# Patient Record
Sex: MALE | Race: BLACK OR AFRICAN AMERICAN | NOT HISPANIC OR LATINO | Employment: UNEMPLOYED | URBAN - METROPOLITAN AREA
[De-identification: names, ages, dates, MRNs, and addresses within clinical notes are randomized per-mention and may not be internally consistent; named-entity substitution may affect disease eponyms.]

---

## 2020-01-01 ENCOUNTER — TELEPHONE (OUTPATIENT)
Dept: PULMONOLOGY | Facility: CLINIC | Age: 60
End: 2020-01-01

## 2020-01-01 ENCOUNTER — ANESTHESIA (OUTPATIENT)
Dept: CARDIOLOGY | Facility: HOSPITAL | Age: 60
DRG: 193 | End: 2020-01-01
Payer: MEDICAID

## 2020-01-01 ENCOUNTER — ANESTHESIA EVENT (OUTPATIENT)
Dept: CARDIOLOGY | Facility: HOSPITAL | Age: 60
DRG: 193 | End: 2020-01-01
Payer: MEDICAID

## 2020-01-01 ENCOUNTER — HOSPITAL ENCOUNTER (INPATIENT)
Facility: HOSPITAL | Age: 60
LOS: 8 days | Discharge: HOME | DRG: 193 | End: 2020-05-31
Attending: EMERGENCY MEDICINE | Admitting: INTERNAL MEDICINE
Payer: MEDICAID

## 2020-01-01 VITALS
WEIGHT: 184.5 LBS | HEIGHT: 63 IN | DIASTOLIC BLOOD PRESSURE: 70 MMHG | SYSTOLIC BLOOD PRESSURE: 109 MMHG | BODY MASS INDEX: 32.69 KG/M2 | OXYGEN SATURATION: 92 % | RESPIRATION RATE: 16 BRPM | TEMPERATURE: 97 F | HEART RATE: 88 BPM

## 2020-01-01 DIAGNOSIS — J96.01 ACUTE RESPIRATORY FAILURE WITH HYPOXIA: Primary | ICD-10-CM

## 2020-01-01 DIAGNOSIS — R07.9 CHEST PAIN: ICD-10-CM

## 2020-01-01 DIAGNOSIS — I50.9 ACUTE CONGESTIVE HEART FAILURE, UNSPECIFIED HEART FAILURE TYPE: ICD-10-CM

## 2020-01-01 DIAGNOSIS — R06.02 SOB (SHORTNESS OF BREATH): ICD-10-CM

## 2020-01-01 DIAGNOSIS — J18.9 PNEUMONIA OF LEFT LOWER LOBE DUE TO INFECTIOUS ORGANISM: ICD-10-CM

## 2020-01-01 DIAGNOSIS — R79.89 TROPONIN I ABOVE REFERENCE RANGE: ICD-10-CM

## 2020-01-01 DIAGNOSIS — I35.0 SEVERE AORTIC STENOSIS: ICD-10-CM

## 2020-01-01 DIAGNOSIS — I05.0 MITRAL STENOSIS: ICD-10-CM

## 2020-01-01 DIAGNOSIS — R06.00 DYSPNEA: ICD-10-CM

## 2020-01-01 DIAGNOSIS — I34.2 NONRHEUMATIC MITRAL VALVE STENOSIS: ICD-10-CM

## 2020-01-01 DIAGNOSIS — I05.2: ICD-10-CM

## 2020-01-01 LAB
ABO + RH BLD: NORMAL
ACE SERPL-CCNC: 21 U/L (ref 16–85)
ALBUMIN SERPL BCP-MCNC: 3.4 G/DL (ref 3.5–5.2)
ALLENS TEST: ABNORMAL
ALP SERPL-CCNC: 65 U/L (ref 55–135)
ALT SERPL W/O P-5'-P-CCNC: 26 U/L (ref 10–44)
ANA PATTERN 1: NORMAL
ANA SER QL IF: POSITIVE
ANA TITR SER IF: NORMAL {TITER}
ANION GAP SERPL CALC-SCNC: 10 MMOL/L (ref 8–16)
ANION GAP SERPL CALC-SCNC: 10 MMOL/L (ref 8–16)
ANION GAP SERPL CALC-SCNC: 11 MMOL/L (ref 8–16)
ANION GAP SERPL CALC-SCNC: 11 MMOL/L (ref 8–16)
ANION GAP SERPL CALC-SCNC: 12 MMOL/L (ref 8–16)
ANION GAP SERPL CALC-SCNC: 13 MMOL/L (ref 8–16)
ANION GAP SERPL CALC-SCNC: 13 MMOL/L (ref 8–16)
ANION GAP SERPL CALC-SCNC: 7 MMOL/L (ref 8–16)
ANION GAP SERPL CALC-SCNC: 8 MMOL/L (ref 8–16)
ANISOCYTOSIS BLD QL SMEAR: SLIGHT
ANTI SM ANTIBODY: 0.07 RATIO (ref 0–0.99)
ANTI SM/RNP ANTIBODY: 0.1 RATIO (ref 0–0.99)
ANTI-SM INTERPRETATION: NEGATIVE
ANTI-SM/RNP INTERPRETATION: NEGATIVE
ANTI-SSA ANTIBODY: 0.12 RATIO (ref 0–0.99)
ANTI-SSA INTERPRETATION: NEGATIVE
ANTI-SSB ANTIBODY: 0.05 RATIO (ref 0–0.99)
ANTI-SSB INTERPRETATION: NEGATIVE
AORTIC ROOT ANNULUS: 3.82 CM
APTT BLDCRRT: 29.3 SEC (ref 21–32)
ASCENDING AORTA: 3.43 CM
AST SERPL-CCNC: 25 U/L (ref 10–40)
AV INDEX (PROSTH): 0.88
AV MEAN GRADIENT: 5 MMHG
AV PEAK GRADIENT: 9 MMHG
AV VALVE AREA: 2.35 CM2
AV VELOCITY RATIO: 0.85
BACTERIA BLD CULT: NORMAL
BACTERIA BLD CULT: NORMAL
BACTERIA SPEC AEROBE CULT: NORMAL
BASOPHILS # BLD AUTO: 0.01 K/UL (ref 0–0.2)
BASOPHILS # BLD AUTO: 0.02 K/UL (ref 0–0.2)
BASOPHILS # BLD AUTO: 0.05 K/UL (ref 0–0.2)
BASOPHILS # BLD AUTO: 0.07 K/UL (ref 0–0.2)
BASOPHILS # BLD AUTO: 0.08 K/UL (ref 0–0.2)
BASOPHILS NFR BLD: 0.1 % (ref 0–1.9)
BASOPHILS NFR BLD: 0.2 % (ref 0–1.9)
BASOPHILS NFR BLD: 0.4 % (ref 0–1.9)
BASOPHILS NFR BLD: 0.4 % (ref 0–1.9)
BILIRUB SERPL-MCNC: 0.5 MG/DL (ref 0.1–1)
BLD GP AB SCN CELLS X3 SERPL QL: NORMAL
BLD PROD TYP BPU: NORMAL
BLD PROD TYP BPU: NORMAL
BLOOD UNIT EXPIRATION DATE: NORMAL
BLOOD UNIT EXPIRATION DATE: NORMAL
BLOOD UNIT TYPE CODE: 5100
BLOOD UNIT TYPE CODE: 5100
BLOOD UNIT TYPE: NORMAL
BLOOD UNIT TYPE: NORMAL
BNP SERPL-MCNC: 1545 PG/ML (ref 0–99)
BNP SERPL-MCNC: 760 PG/ML (ref 0–99)
BSA FOR ECHO PROCEDURE: 1.85 M2
BSA FOR ECHO PROCEDURE: 1.88 M2
BUN SERPL-MCNC: 27 MG/DL (ref 6–20)
BUN SERPL-MCNC: 30 MG/DL (ref 6–20)
BUN SERPL-MCNC: 32 MG/DL (ref 6–20)
BUN SERPL-MCNC: 35 MG/DL (ref 6–20)
BUN SERPL-MCNC: 41 MG/DL (ref 6–20)
BUN SERPL-MCNC: 46 MG/DL (ref 6–20)
BUN SERPL-MCNC: 48 MG/DL (ref 6–20)
BUN SERPL-MCNC: 54 MG/DL (ref 6–20)
BUN SERPL-MCNC: 55 MG/DL (ref 6–20)
CALCIUM SERPL-MCNC: 8.7 MG/DL (ref 8.7–10.5)
CALCIUM SERPL-MCNC: 8.9 MG/DL (ref 8.7–10.5)
CALCIUM SERPL-MCNC: 9 MG/DL (ref 8.7–10.5)
CALCIUM SERPL-MCNC: 9.2 MG/DL (ref 8.7–10.5)
CALCIUM SERPL-MCNC: 9.3 MG/DL (ref 8.7–10.5)
CALCIUM SERPL-MCNC: 9.3 MG/DL (ref 8.7–10.5)
CALCIUM SERPL-MCNC: 9.4 MG/DL (ref 8.7–10.5)
CALCIUM SERPL-MCNC: 9.7 MG/DL (ref 8.7–10.5)
CALCIUM SERPL-MCNC: 9.8 MG/DL (ref 8.7–10.5)
CHLORIDE SERPL-SCNC: 100 MMOL/L (ref 95–110)
CHLORIDE SERPL-SCNC: 101 MMOL/L (ref 95–110)
CHLORIDE SERPL-SCNC: 102 MMOL/L (ref 95–110)
CHLORIDE SERPL-SCNC: 103 MMOL/L (ref 95–110)
CHLORIDE SERPL-SCNC: 103 MMOL/L (ref 95–110)
CHLORIDE SERPL-SCNC: 97 MMOL/L (ref 95–110)
CHLORIDE SERPL-SCNC: 98 MMOL/L (ref 95–110)
CK MB SERPL-MCNC: 3.4 NG/ML (ref 0.1–6.5)
CK MB SERPL-RTO: 2.7 % (ref 0–5)
CK SERPL-CCNC: 125 U/L (ref 20–200)
CK SERPL-CCNC: 125 U/L (ref 20–200)
CO2 SERPL-SCNC: 21 MMOL/L (ref 23–29)
CO2 SERPL-SCNC: 22 MMOL/L (ref 23–29)
CO2 SERPL-SCNC: 22 MMOL/L (ref 23–29)
CO2 SERPL-SCNC: 23 MMOL/L (ref 23–29)
CO2 SERPL-SCNC: 23 MMOL/L (ref 23–29)
CO2 SERPL-SCNC: 24 MMOL/L (ref 23–29)
CO2 SERPL-SCNC: 25 MMOL/L (ref 23–29)
CODING SYSTEM: NORMAL
CODING SYSTEM: NORMAL
CREAT SERPL-MCNC: 1.2 MG/DL (ref 0.5–1.4)
CREAT SERPL-MCNC: 1.5 MG/DL (ref 0.5–1.4)
CREAT SERPL-MCNC: 1.6 MG/DL (ref 0.5–1.4)
CREAT SERPL-MCNC: 1.6 MG/DL (ref 0.5–1.4)
CREAT SERPL-MCNC: 1.7 MG/DL (ref 0.5–1.4)
CREAT SERPL-MCNC: 1.7 MG/DL (ref 0.5–1.4)
CREAT SERPL-MCNC: 1.8 MG/DL (ref 0.5–1.4)
CREAT SERPL-MCNC: 1.8 MG/DL (ref 0.5–1.4)
CREAT SERPL-MCNC: 1.9 MG/DL (ref 0.5–1.4)
CV ECHO LV RWT: 0.78 CM
DACRYOCYTES BLD QL SMEAR: ABNORMAL
DELSYS: ABNORMAL
DIFFERENTIAL METHOD: ABNORMAL
DISPENSE STATUS: NORMAL
DISPENSE STATUS: NORMAL
DOP CALC AO PEAK VEL: 1.52 M/S
DOP CALC AO VTI: 26.37 CM
DOP CALC LVOT AREA: 2.7 CM2
DOP CALC LVOT DIAMETER: 1.85 CM
DOP CALC LVOT PEAK VEL: 1.29 M/S
DOP CALC LVOT STROKE VOLUME: 62.01 CM3
DOP CALCLVOT PEAK VEL VTI: 23.08 CM
DSDNA AB SER-ACNC: NORMAL [IU]/ML
E WAVE DECELERATION TIME: 594.87 MSEC
E/A RATIO: 0.84
ECHO LV POSTERIOR WALL: 1.35 CM (ref 0.6–1.1)
EOSINOPHIL # BLD AUTO: 0 K/UL (ref 0–0.5)
EOSINOPHIL # BLD AUTO: 0.1 K/UL (ref 0–0.5)
EOSINOPHIL # BLD AUTO: 0.2 K/UL (ref 0–0.5)
EOSINOPHIL # BLD AUTO: 0.2 K/UL (ref 0–0.5)
EOSINOPHIL # BLD AUTO: 0.4 K/UL (ref 0–0.5)
EOSINOPHIL NFR BLD: 0 % (ref 0–8)
EOSINOPHIL NFR BLD: 0.2 % (ref 0–8)
EOSINOPHIL NFR BLD: 0.5 % (ref 0–8)
EOSINOPHIL NFR BLD: 0.9 % (ref 0–8)
EOSINOPHIL NFR BLD: 1 % (ref 0–8)
EOSINOPHIL NFR BLD: 2.4 % (ref 0–8)
ERYTHROCYTE [DISTWIDTH] IN BLOOD BY AUTOMATED COUNT: 18.3 % (ref 11.5–14.5)
ERYTHROCYTE [DISTWIDTH] IN BLOOD BY AUTOMATED COUNT: 18.4 % (ref 11.5–14.5)
ERYTHROCYTE [DISTWIDTH] IN BLOOD BY AUTOMATED COUNT: 18.4 % (ref 11.5–14.5)
ERYTHROCYTE [DISTWIDTH] IN BLOOD BY AUTOMATED COUNT: 18.6 % (ref 11.5–14.5)
ERYTHROCYTE [DISTWIDTH] IN BLOOD BY AUTOMATED COUNT: 18.6 % (ref 11.5–14.5)
ERYTHROCYTE [DISTWIDTH] IN BLOOD BY AUTOMATED COUNT: 18.7 % (ref 11.5–14.5)
ERYTHROCYTE [DISTWIDTH] IN BLOOD BY AUTOMATED COUNT: 18.8 % (ref 11.5–14.5)
ERYTHROCYTE [DISTWIDTH] IN BLOOD BY AUTOMATED COUNT: 18.8 % (ref 11.5–14.5)
ERYTHROCYTE [DISTWIDTH] IN BLOOD BY AUTOMATED COUNT: 18.9 % (ref 11.5–14.5)
EST. GFR  (AFRICAN AMERICAN): 43 ML/MIN/1.73 M^2
EST. GFR  (AFRICAN AMERICAN): 46 ML/MIN/1.73 M^2
EST. GFR  (AFRICAN AMERICAN): 46 ML/MIN/1.73 M^2
EST. GFR  (AFRICAN AMERICAN): 50 ML/MIN/1.73 M^2
EST. GFR  (AFRICAN AMERICAN): 50 ML/MIN/1.73 M^2
EST. GFR  (AFRICAN AMERICAN): 53 ML/MIN/1.73 M^2
EST. GFR  (AFRICAN AMERICAN): 53 ML/MIN/1.73 M^2
EST. GFR  (AFRICAN AMERICAN): 58 ML/MIN/1.73 M^2
EST. GFR  (AFRICAN AMERICAN): >60 ML/MIN/1.73 M^2
EST. GFR  (NON AFRICAN AMERICAN): 37 ML/MIN/1.73 M^2
EST. GFR  (NON AFRICAN AMERICAN): 40 ML/MIN/1.73 M^2
EST. GFR  (NON AFRICAN AMERICAN): 40 ML/MIN/1.73 M^2
EST. GFR  (NON AFRICAN AMERICAN): 43 ML/MIN/1.73 M^2
EST. GFR  (NON AFRICAN AMERICAN): 43 ML/MIN/1.73 M^2
EST. GFR  (NON AFRICAN AMERICAN): 46 ML/MIN/1.73 M^2
EST. GFR  (NON AFRICAN AMERICAN): 46 ML/MIN/1.73 M^2
EST. GFR  (NON AFRICAN AMERICAN): 50 ML/MIN/1.73 M^2
EST. GFR  (NON AFRICAN AMERICAN): >60 ML/MIN/1.73 M^2
FIO2: 28
FLOW: 2
FRACTIONAL SHORTENING: 35 % (ref 28–44)
GAMMA INTERFERON BACKGROUND BLD IA-ACNC: 0.02 IU/ML
GLUCOSE SERPL-MCNC: 105 MG/DL (ref 70–110)
GLUCOSE SERPL-MCNC: 115 MG/DL (ref 70–110)
GLUCOSE SERPL-MCNC: 116 MG/DL (ref 70–110)
GLUCOSE SERPL-MCNC: 117 MG/DL (ref 70–110)
GLUCOSE SERPL-MCNC: 123 MG/DL (ref 70–110)
GLUCOSE SERPL-MCNC: 157 MG/DL (ref 70–110)
GLUCOSE SERPL-MCNC: 158 MG/DL (ref 70–110)
GLUCOSE SERPL-MCNC: 166 MG/DL (ref 70–110)
GLUCOSE SERPL-MCNC: 173 MG/DL (ref 70–110)
GRAM STN SPEC: NORMAL
GRAM STN SPEC: NORMAL
HCO3 UR-SCNC: 22.2 MMOL/L (ref 24–28)
HCT VFR BLD AUTO: 23.7 % (ref 40–54)
HCT VFR BLD AUTO: 26 % (ref 40–54)
HCT VFR BLD AUTO: 26.5 % (ref 40–54)
HCT VFR BLD AUTO: 27.1 % (ref 40–54)
HCT VFR BLD AUTO: 31.3 % (ref 40–54)
HCT VFR BLD AUTO: 32.7 % (ref 40–54)
HCT VFR BLD AUTO: 33.4 % (ref 40–54)
HCT VFR BLD AUTO: 33.8 % (ref 40–54)
HCT VFR BLD AUTO: 33.8 % (ref 40–54)
HGB BLD-MCNC: 10 G/DL (ref 14–18)
HGB BLD-MCNC: 10.3 G/DL (ref 14–18)
HGB BLD-MCNC: 10.3 G/DL (ref 14–18)
HGB BLD-MCNC: 10.4 G/DL (ref 14–18)
HGB BLD-MCNC: 7 G/DL (ref 14–18)
HGB BLD-MCNC: 7.7 G/DL (ref 14–18)
HGB BLD-MCNC: 7.8 G/DL (ref 14–18)
HGB BLD-MCNC: 7.9 G/DL (ref 14–18)
HGB BLD-MCNC: 9.1 G/DL (ref 14–18)
HYPOCHROMIA BLD QL SMEAR: ABNORMAL
IMM GRANULOCYTES # BLD AUTO: 0.08 K/UL (ref 0–0.04)
IMM GRANULOCYTES # BLD AUTO: 0.11 K/UL (ref 0–0.04)
IMM GRANULOCYTES # BLD AUTO: 0.14 K/UL (ref 0–0.04)
IMM GRANULOCYTES # BLD AUTO: 0.15 K/UL (ref 0–0.04)
IMM GRANULOCYTES # BLD AUTO: 0.17 K/UL (ref 0–0.04)
IMM GRANULOCYTES # BLD AUTO: 0.18 K/UL (ref 0–0.04)
IMM GRANULOCYTES NFR BLD AUTO: 0.5 % (ref 0–0.5)
IMM GRANULOCYTES NFR BLD AUTO: 0.7 % (ref 0–0.5)
IMM GRANULOCYTES NFR BLD AUTO: 0.8 % (ref 0–0.5)
IMM GRANULOCYTES NFR BLD AUTO: 0.8 % (ref 0–0.5)
IMM GRANULOCYTES NFR BLD AUTO: 0.9 % (ref 0–0.5)
IMM GRANULOCYTES NFR BLD AUTO: 0.9 % (ref 0–0.5)
IMM GRANULOCYTES NFR BLD AUTO: 1 % (ref 0–0.5)
INFLUENZA A, MOLECULAR: NEGATIVE
INFLUENZA B, MOLECULAR: NEGATIVE
INR PPP: 1.1 (ref 0.8–1.2)
INR PPP: 1.1 (ref 0.8–1.2)
INTERVENTRICULAR SEPTUM: 1.31 CM (ref 0.6–1.1)
IVRT: 50.75 MSEC
LA MAJOR: 5.2 CM
LA MINOR: 5.49 CM
LA WIDTH: 4.11 CM
LACTATE SERPL-SCNC: 1.7 MMOL/L (ref 0.5–2.2)
LEFT ATRIUM SIZE: 3.88 CM
LEFT ATRIUM VOLUME INDEX: 40.2 ML/M2
LEFT ATRIUM VOLUME: 72.4 CM3
LEFT INTERNAL DIMENSION IN SYSTOLE: 2.24 CM (ref 2.1–4)
LEFT VENTRICLE DIASTOLIC VOLUME INDEX: 27.18 ML/M2
LEFT VENTRICLE DIASTOLIC VOLUME: 48.93 ML
LEFT VENTRICLE MASS INDEX: 86 G/M2
LEFT VENTRICLE SYSTOLIC VOLUME INDEX: 9.4 ML/M2
LEFT VENTRICLE SYSTOLIC VOLUME: 16.89 ML
LEFT VENTRICULAR INTERNAL DIMENSION IN DIASTOLE: 3.44 CM (ref 3.5–6)
LEFT VENTRICULAR MASS: 155.58 G
LYMPHOCYTES # BLD AUTO: 1 K/UL (ref 1–4.8)
LYMPHOCYTES # BLD AUTO: 1.3 K/UL (ref 1–4.8)
LYMPHOCYTES # BLD AUTO: 1.3 K/UL (ref 1–4.8)
LYMPHOCYTES # BLD AUTO: 1.5 K/UL (ref 1–4.8)
LYMPHOCYTES # BLD AUTO: 1.5 K/UL (ref 1–4.8)
LYMPHOCYTES # BLD AUTO: 2 K/UL (ref 1–4.8)
LYMPHOCYTES # BLD AUTO: 2.1 K/UL (ref 1–4.8)
LYMPHOCYTES # BLD AUTO: 2.2 K/UL (ref 1–4.8)
LYMPHOCYTES # BLD AUTO: 2.3 K/UL (ref 1–4.8)
LYMPHOCYTES NFR BLD: 12 % (ref 18–48)
LYMPHOCYTES NFR BLD: 12.1 % (ref 18–48)
LYMPHOCYTES NFR BLD: 12.9 % (ref 18–48)
LYMPHOCYTES NFR BLD: 6.7 % (ref 18–48)
LYMPHOCYTES NFR BLD: 7 % (ref 18–48)
LYMPHOCYTES NFR BLD: 7.6 % (ref 18–48)
LYMPHOCYTES NFR BLD: 8.4 % (ref 18–48)
LYMPHOCYTES NFR BLD: 8.7 % (ref 18–48)
LYMPHOCYTES NFR BLD: 9.1 % (ref 18–48)
M TB IFN-G CD4+ BCKGRND COR BLD-ACNC: 0 IU/ML
MAGNESIUM SERPL-MCNC: 2 MG/DL (ref 1.6–2.6)
MAGNESIUM SERPL-MCNC: 2.2 MG/DL (ref 1.6–2.6)
MAGNESIUM SERPL-MCNC: 2.3 MG/DL (ref 1.6–2.6)
MAGNESIUM SERPL-MCNC: 2.3 MG/DL (ref 1.6–2.6)
MAGNESIUM SERPL-MCNC: 2.4 MG/DL (ref 1.6–2.6)
MAGNESIUM SERPL-MCNC: 2.5 MG/DL (ref 1.6–2.6)
MAGNESIUM SERPL-MCNC: 2.7 MG/DL (ref 1.6–2.6)
MAGNESIUM SERPL-MCNC: 2.7 MG/DL (ref 1.6–2.6)
MCH RBC QN AUTO: 21.6 PG (ref 27–31)
MCH RBC QN AUTO: 22.1 PG (ref 27–31)
MCH RBC QN AUTO: 22.4 PG (ref 27–31)
MCH RBC QN AUTO: 23.5 PG (ref 27–31)
MCH RBC QN AUTO: 23.5 PG (ref 27–31)
MCH RBC QN AUTO: 23.6 PG (ref 27–31)
MCH RBC QN AUTO: 23.8 PG (ref 27–31)
MCHC RBC AUTO-ENTMCNC: 29.1 G/DL (ref 32–36)
MCHC RBC AUTO-ENTMCNC: 29.2 G/DL (ref 32–36)
MCHC RBC AUTO-ENTMCNC: 29.4 G/DL (ref 32–36)
MCHC RBC AUTO-ENTMCNC: 29.5 G/DL (ref 32–36)
MCHC RBC AUTO-ENTMCNC: 29.6 G/DL (ref 32–36)
MCHC RBC AUTO-ENTMCNC: 30.5 G/DL (ref 32–36)
MCHC RBC AUTO-ENTMCNC: 30.6 G/DL (ref 32–36)
MCHC RBC AUTO-ENTMCNC: 30.8 G/DL (ref 32–36)
MCHC RBC AUTO-ENTMCNC: 30.8 G/DL (ref 32–36)
MCV RBC AUTO: 73 FL (ref 82–98)
MCV RBC AUTO: 75 FL (ref 82–98)
MCV RBC AUTO: 76 FL (ref 82–98)
MCV RBC AUTO: 77 FL (ref 82–98)
MITOGEN IGNF BCKGRD COR BLD-ACNC: 0.11 IU/ML
MODE: ABNORMAL
MONOCYTES # BLD AUTO: 1 K/UL (ref 0.3–1)
MONOCYTES # BLD AUTO: 1.1 K/UL (ref 0.3–1)
MONOCYTES # BLD AUTO: 1.2 K/UL (ref 0.3–1)
MONOCYTES # BLD AUTO: 1.3 K/UL (ref 0.3–1)
MONOCYTES # BLD AUTO: 2 K/UL (ref 0.3–1)
MONOCYTES # BLD AUTO: 2.1 K/UL (ref 0.3–1)
MONOCYTES # BLD AUTO: 2.1 K/UL (ref 0.3–1)
MONOCYTES NFR BLD: 11.5 % (ref 4–15)
MONOCYTES NFR BLD: 11.6 % (ref 4–15)
MONOCYTES NFR BLD: 11.7 % (ref 4–15)
MONOCYTES NFR BLD: 5.9 % (ref 4–15)
MONOCYTES NFR BLD: 5.9 % (ref 4–15)
MONOCYTES NFR BLD: 6.1 % (ref 4–15)
MONOCYTES NFR BLD: 7.3 % (ref 4–15)
MONOCYTES NFR BLD: 7.5 % (ref 4–15)
MONOCYTES NFR BLD: 7.8 % (ref 4–15)
MRSA SPEC QL CULT: NORMAL
MV PEAK A VEL: 2.17 M/S
MV PEAK E VEL: 1.82 M/S
MV STENOSIS PRESSURE HALF TIME: 153 MS
MV STENOSIS PRESSURE HALF TIME: 173 MS
MV VALVE AREA BY PLANIMETRY: 0.9 CM2
MV VALVE AREA P 1/2 METHOD: 1.27 CM2
MV VALVE AREA P 1/2 METHOD: 1.44 CM2
NEUTROPHILS # BLD AUTO: 12.5 K/UL (ref 1.8–7.7)
NEUTROPHILS # BLD AUTO: 13 K/UL (ref 1.8–7.7)
NEUTROPHILS # BLD AUTO: 13.2 K/UL (ref 1.8–7.7)
NEUTROPHILS # BLD AUTO: 13.3 K/UL (ref 1.8–7.7)
NEUTROPHILS # BLD AUTO: 13.3 K/UL (ref 1.8–7.7)
NEUTROPHILS # BLD AUTO: 13.6 K/UL (ref 1.8–7.7)
NEUTROPHILS # BLD AUTO: 14.1 K/UL (ref 1.8–7.7)
NEUTROPHILS # BLD AUTO: 17.8 K/UL (ref 1.8–7.7)
NEUTROPHILS # BLD AUTO: 18.7 K/UL (ref 1.8–7.7)
NEUTROPHILS NFR BLD: 73.5 % (ref 38–73)
NEUTROPHILS NFR BLD: 75.3 % (ref 38–73)
NEUTROPHILS NFR BLD: 76.8 % (ref 38–73)
NEUTROPHILS NFR BLD: 78.7 % (ref 38–73)
NEUTROPHILS NFR BLD: 83.3 % (ref 38–73)
NEUTROPHILS NFR BLD: 83.6 % (ref 38–73)
NEUTROPHILS NFR BLD: 85 % (ref 38–73)
NEUTROPHILS NFR BLD: 85 % (ref 38–73)
NEUTROPHILS NFR BLD: 85.4 % (ref 38–73)
NRBC BLD-RTO: 0 /100 WBC
NRBC BLD-RTO: 1 /100 WBC
NRBC BLD-RTO: 1 /100 WBC
NRBC BLD-RTO: 3 /100 WBC
NRBC BLD-RTO: 7 /100 WBC
NRBC BLD-RTO: 7 /100 WBC
NRBC BLD-RTO: 9 /100 WBC
NUM UNITS TRANS PACKED RBC: NORMAL
NUM UNITS TRANS PACKED RBC: NORMAL
OVALOCYTES BLD QL SMEAR: ABNORMAL
PCO2 BLDA: 30.4 MMHG (ref 35–45)
PH SMN: 7.47 [PH] (ref 7.35–7.45)
PHOSPHATE SERPL-MCNC: 3.1 MG/DL (ref 2.7–4.5)
PHOSPHATE SERPL-MCNC: 3.8 MG/DL (ref 2.7–4.5)
PHOSPHATE SERPL-MCNC: 4.3 MG/DL (ref 2.7–4.5)
PHOSPHATE SERPL-MCNC: 4.4 MG/DL (ref 2.7–4.5)
PHOSPHATE SERPL-MCNC: 4.7 MG/DL (ref 2.7–4.5)
PHOSPHATE SERPL-MCNC: 4.7 MG/DL (ref 2.7–4.5)
PHOSPHATE SERPL-MCNC: 5 MG/DL (ref 2.7–4.5)
PHOSPHATE SERPL-MCNC: 5.8 MG/DL (ref 2.7–4.5)
PISA TR MAX VEL: 5.06 M/S
PLATELET # BLD AUTO: 329 K/UL (ref 150–350)
PLATELET # BLD AUTO: 330 K/UL (ref 150–350)
PLATELET # BLD AUTO: 332 K/UL (ref 150–350)
PLATELET # BLD AUTO: 360 K/UL (ref 150–350)
PLATELET # BLD AUTO: 360 K/UL (ref 150–350)
PLATELET # BLD AUTO: 361 K/UL (ref 150–350)
PLATELET # BLD AUTO: 367 K/UL (ref 150–350)
PLATELET # BLD AUTO: 368 K/UL (ref 150–350)
PLATELET # BLD AUTO: 383 K/UL (ref 150–350)
PLATELET BLD QL SMEAR: ABNORMAL
PMV BLD AUTO: 10.1 FL (ref 9.2–12.9)
PMV BLD AUTO: 10.1 FL (ref 9.2–12.9)
PMV BLD AUTO: 9.4 FL (ref 9.2–12.9)
PMV BLD AUTO: 9.5 FL (ref 9.2–12.9)
PMV BLD AUTO: 9.8 FL (ref 9.2–12.9)
PMV BLD AUTO: 9.9 FL (ref 9.2–12.9)
PMV BLD AUTO: 9.9 FL (ref 9.2–12.9)
PO2 BLDA: 65 MMHG (ref 80–100)
POC BE: -1 MMOL/L
POC SATURATED O2: 94 % (ref 95–100)
POIKILOCYTOSIS BLD QL SMEAR: SLIGHT
POLYCHROMASIA BLD QL SMEAR: ABNORMAL
POTASSIUM SERPL-SCNC: 4.1 MMOL/L (ref 3.5–5.1)
POTASSIUM SERPL-SCNC: 4.3 MMOL/L (ref 3.5–5.1)
POTASSIUM SERPL-SCNC: 4.4 MMOL/L (ref 3.5–5.1)
POTASSIUM SERPL-SCNC: 4.4 MMOL/L (ref 3.5–5.1)
POTASSIUM SERPL-SCNC: 4.5 MMOL/L (ref 3.5–5.1)
POTASSIUM SERPL-SCNC: 4.9 MMOL/L (ref 3.5–5.1)
POTASSIUM SERPL-SCNC: 5 MMOL/L (ref 3.5–5.1)
PROT SERPL-MCNC: 7.7 G/DL (ref 6–8.4)
PROTHROMBIN TIME: 11.5 SEC (ref 9–12.5)
PROTHROMBIN TIME: 12 SEC (ref 9–12.5)
RA MAJOR: 4.95 CM
RA PRESSURE: 8 MMHG
RA WIDTH: 5.05 CM
RBC # BLD AUTO: 3.24 M/UL (ref 4.6–6.2)
RBC # BLD AUTO: 3.48 M/UL (ref 4.6–6.2)
RBC # BLD AUTO: 3.49 M/UL (ref 4.6–6.2)
RBC # BLD AUTO: 3.58 M/UL (ref 4.6–6.2)
RBC # BLD AUTO: 4.11 M/UL (ref 4.6–6.2)
RBC # BLD AUTO: 4.26 M/UL (ref 4.6–6.2)
RBC # BLD AUTO: 4.33 M/UL (ref 4.6–6.2)
RBC # BLD AUTO: 4.39 M/UL (ref 4.6–6.2)
RBC # BLD AUTO: 4.41 M/UL (ref 4.6–6.2)
RHEUMATOID FACT SERPL-ACNC: 11 IU/ML (ref 0–15)
RIGHT VENTRICULAR END-DIASTOLIC DIMENSION: 3.03 CM
SAMPLE: ABNORMAL
SARS-COV-2 RDRP RESP QL NAA+PROBE: NEGATIVE
SINUS: 3.52 CM
SITE: ABNORMAL
SODIUM SERPL-SCNC: 132 MMOL/L (ref 136–145)
SODIUM SERPL-SCNC: 132 MMOL/L (ref 136–145)
SODIUM SERPL-SCNC: 133 MMOL/L (ref 136–145)
SODIUM SERPL-SCNC: 134 MMOL/L (ref 136–145)
SODIUM SERPL-SCNC: 134 MMOL/L (ref 136–145)
SODIUM SERPL-SCNC: 135 MMOL/L (ref 136–145)
SODIUM SERPL-SCNC: 137 MMOL/L (ref 136–145)
SPECIMEN SOURCE: NORMAL
SPHEROCYTES BLD QL SMEAR: ABNORMAL
STJ: 3.4 CM
STOMATOCYTES BLD QL SMEAR: PRESENT
TB GOLD PLUS: ABNORMAL
TB2 - NIL: 0 IU/ML
TR MAX PG: 102 MMHG
TRICUSPID ANNULAR PLANE SYSTOLIC EXCURSION: 1.15 CM
TROPONIN I SERPL DL<=0.01 NG/ML-MCNC: 0.03 NG/ML (ref 0–0.03)
TV REST PULMONARY ARTERY PRESSURE: 110 MMHG
VANCOMYCIN SERPL-MCNC: 8.1 UG/ML
VANCOMYCIN TROUGH SERPL-MCNC: 15.2 UG/ML (ref 10–22)
WBC # BLD AUTO: 15.05 K/UL (ref 3.9–12.7)
WBC # BLD AUTO: 15.47 K/UL (ref 3.9–12.7)
WBC # BLD AUTO: 16.49 K/UL (ref 3.9–12.7)
WBC # BLD AUTO: 16.86 K/UL (ref 3.9–12.7)
WBC # BLD AUTO: 16.97 K/UL (ref 3.9–12.7)
WBC # BLD AUTO: 18.09 K/UL (ref 3.9–12.7)
WBC # BLD AUTO: 18.12 K/UL (ref 3.9–12.7)
WBC # BLD AUTO: 21.34 K/UL (ref 3.9–12.7)
WBC # BLD AUTO: 22.02 K/UL (ref 3.9–12.7)

## 2020-01-01 PROCEDURE — 83735 ASSAY OF MAGNESIUM: CPT

## 2020-01-01 PROCEDURE — 25000003 PHARM REV CODE 250: Performed by: INTERNAL MEDICINE

## 2020-01-01 PROCEDURE — 63600175 PHARM REV CODE 636 W HCPCS: Performed by: PHYSICIAN ASSISTANT

## 2020-01-01 PROCEDURE — 99233 PR SUBSEQUENT HOSPITAL CARE,LEVL III: ICD-10-PCS | Mod: ,,, | Performed by: INTERNAL MEDICINE

## 2020-01-01 PROCEDURE — 36410 VNPNXR 3YR/> PHY/QHP DX/THER: CPT

## 2020-01-01 PROCEDURE — 99232 SBSQ HOSP IP/OBS MODERATE 35: CPT | Mod: ,,, | Performed by: INTERNAL MEDICINE

## 2020-01-01 PROCEDURE — 25000003 PHARM REV CODE 250: Performed by: PHYSICIAN ASSISTANT

## 2020-01-01 PROCEDURE — 63600175 PHARM REV CODE 636 W HCPCS: Performed by: INTERNAL MEDICINE

## 2020-01-01 PROCEDURE — 85025 COMPLETE CBC W/AUTO DIFF WBC: CPT

## 2020-01-01 PROCEDURE — 99232 PR SUBSEQUENT HOSPITAL CARE,LEVL II: ICD-10-PCS | Mod: ,,, | Performed by: INTERNAL MEDICINE

## 2020-01-01 PROCEDURE — 94761 N-INVAS EAR/PLS OXIMETRY MLT: CPT

## 2020-01-01 PROCEDURE — P9016 RBC LEUKOCYTES REDUCED: HCPCS

## 2020-01-01 PROCEDURE — G0378 HOSPITAL OBSERVATION PER HR: HCPCS

## 2020-01-01 PROCEDURE — 96375 TX/PRO/DX INJ NEW DRUG ADDON: CPT

## 2020-01-01 PROCEDURE — 36415 COLL VENOUS BLD VENIPUNCTURE: CPT

## 2020-01-01 PROCEDURE — 84100 ASSAY OF PHOSPHORUS: CPT

## 2020-01-01 PROCEDURE — 25000242 PHARM REV CODE 250 ALT 637 W/ HCPCS: Performed by: EMERGENCY MEDICINE

## 2020-01-01 PROCEDURE — 36430 TRANSFUSION BLD/BLD COMPNT: CPT

## 2020-01-01 PROCEDURE — 85610 PROTHROMBIN TIME: CPT

## 2020-01-01 PROCEDURE — 86039 ANTINUCLEAR ANTIBODIES (ANA): CPT

## 2020-01-01 PROCEDURE — 27000221 HC OXYGEN, UP TO 24 HOURS

## 2020-01-01 PROCEDURE — 25000242 PHARM REV CODE 250 ALT 637 W/ HCPCS: Performed by: INTERNAL MEDICINE

## 2020-01-01 PROCEDURE — 80048 BASIC METABOLIC PNL TOTAL CA: CPT

## 2020-01-01 PROCEDURE — 94640 AIRWAY INHALATION TREATMENT: CPT

## 2020-01-01 PROCEDURE — 96365 THER/PROPH/DIAG IV INF INIT: CPT

## 2020-01-01 PROCEDURE — 94760 N-INVAS EAR/PLS OXIMETRY 1: CPT

## 2020-01-01 PROCEDURE — 21400001 HC TELEMETRY ROOM

## 2020-01-01 PROCEDURE — 99233 SBSQ HOSP IP/OBS HIGH 50: CPT | Mod: ,,, | Performed by: INTERNAL MEDICINE

## 2020-01-01 PROCEDURE — 82550 ASSAY OF CK (CPK): CPT

## 2020-01-01 PROCEDURE — 25000242 PHARM REV CODE 250 ALT 637 W/ HCPCS: Performed by: PHYSICIAN ASSISTANT

## 2020-01-01 PROCEDURE — 25000003 PHARM REV CODE 250: Performed by: NURSE ANESTHETIST, CERTIFIED REGISTERED

## 2020-01-01 PROCEDURE — 82803 BLOOD GASES ANY COMBINATION: CPT

## 2020-01-01 PROCEDURE — 99900035 HC TECH TIME PER 15 MIN (STAT)

## 2020-01-01 PROCEDURE — 82800 BLOOD PH: CPT

## 2020-01-01 PROCEDURE — C1751 CATH, INF, PER/CENT/MIDLINE: HCPCS

## 2020-01-01 PROCEDURE — 84484 ASSAY OF TROPONIN QUANT: CPT

## 2020-01-01 PROCEDURE — 80202 ASSAY OF VANCOMYCIN: CPT

## 2020-01-01 PROCEDURE — 93005 ELECTROCARDIOGRAM TRACING: CPT

## 2020-01-01 PROCEDURE — 99291 CRITICAL CARE FIRST HOUR: CPT | Mod: 25

## 2020-01-01 PROCEDURE — 84484 ASSAY OF TROPONIN QUANT: CPT | Mod: 91

## 2020-01-01 PROCEDURE — 63600175 PHARM REV CODE 636 W HCPCS: Performed by: EMERGENCY MEDICINE

## 2020-01-01 PROCEDURE — 96376 TX/PRO/DX INJ SAME DRUG ADON: CPT

## 2020-01-01 PROCEDURE — 63600175 PHARM REV CODE 636 W HCPCS: Performed by: NURSE ANESTHETIST, CERTIFIED REGISTERED

## 2020-01-01 PROCEDURE — 83880 ASSAY OF NATRIURETIC PEPTIDE: CPT

## 2020-01-01 PROCEDURE — 96366 THER/PROPH/DIAG IV INF ADDON: CPT

## 2020-01-01 PROCEDURE — 87205 SMEAR GRAM STAIN: CPT

## 2020-01-01 PROCEDURE — 86920 COMPATIBILITY TEST SPIN: CPT

## 2020-01-01 PROCEDURE — 93010 ELECTROCARDIOGRAM REPORT: CPT | Mod: ,,, | Performed by: INTERNAL MEDICINE

## 2020-01-01 PROCEDURE — 86850 RBC ANTIBODY SCREEN: CPT

## 2020-01-01 PROCEDURE — 85730 THROMBOPLASTIN TIME PARTIAL: CPT

## 2020-01-01 PROCEDURE — 86480 TB TEST CELL IMMUN MEASURE: CPT

## 2020-01-01 PROCEDURE — 99202 PR OFFICE/OUTPT VISIT, NEW, LEVL II, 15-29 MIN: ICD-10-PCS | Mod: ,,, | Performed by: INTERNAL MEDICINE

## 2020-01-01 PROCEDURE — 82553 CREATINE MB FRACTION: CPT

## 2020-01-01 PROCEDURE — 87040 BLOOD CULTURE FOR BACTERIA: CPT

## 2020-01-01 PROCEDURE — 80053 COMPREHEN METABOLIC PANEL: CPT

## 2020-01-01 PROCEDURE — 93010 EKG 12-LEAD: ICD-10-PCS | Mod: ,,, | Performed by: INTERNAL MEDICINE

## 2020-01-01 PROCEDURE — 25000003 PHARM REV CODE 250: Performed by: EMERGENCY MEDICINE

## 2020-01-01 PROCEDURE — 87081 CULTURE SCREEN ONLY: CPT

## 2020-01-01 PROCEDURE — 94799 UNLISTED PULMONARY SVC/PX: CPT

## 2020-01-01 PROCEDURE — 86038 ANTINUCLEAR ANTIBODIES: CPT

## 2020-01-01 PROCEDURE — 96367 TX/PROPH/DG ADDL SEQ IV INF: CPT

## 2020-01-01 PROCEDURE — 99202 OFFICE O/P NEW SF 15 MIN: CPT | Mod: ,,, | Performed by: INTERNAL MEDICINE

## 2020-01-01 PROCEDURE — 87070 CULTURE OTHR SPECIMN AEROBIC: CPT

## 2020-01-01 PROCEDURE — 86235 NUCLEAR ANTIGEN ANTIBODY: CPT | Mod: 59

## 2020-01-01 PROCEDURE — 83605 ASSAY OF LACTIC ACID: CPT

## 2020-01-01 PROCEDURE — 82164 ANGIOTENSIN I ENZYME TEST: CPT

## 2020-01-01 PROCEDURE — 36600 WITHDRAWAL OF ARTERIAL BLOOD: CPT

## 2020-01-01 PROCEDURE — 86431 RHEUMATOID FACTOR QUANT: CPT

## 2020-01-01 PROCEDURE — 87502 INFLUENZA DNA AMP PROBE: CPT

## 2020-01-01 PROCEDURE — U0002 COVID-19 LAB TEST NON-CDC: HCPCS

## 2020-01-01 PROCEDURE — 63600175 PHARM REV CODE 636 W HCPCS

## 2020-01-01 RX ORDER — PROPOFOL 10 MG/ML
VIAL (ML) INTRAVENOUS
Status: DISCONTINUED | OUTPATIENT
Start: 2020-01-01 | End: 2020-01-01

## 2020-01-01 RX ORDER — ARFORMOTEROL TARTRATE 15 UG/2ML
15 SOLUTION RESPIRATORY (INHALATION) 2 TIMES DAILY
Status: DISCONTINUED | OUTPATIENT
Start: 2020-01-01 | End: 2020-01-01 | Stop reason: HOSPADM

## 2020-01-01 RX ORDER — IPRATROPIUM BROMIDE AND ALBUTEROL SULFATE 2.5; .5 MG/3ML; MG/3ML
3 SOLUTION RESPIRATORY (INHALATION) EVERY 6 HOURS PRN
Status: DISCONTINUED | OUTPATIENT
Start: 2020-01-01 | End: 2020-01-01 | Stop reason: HOSPADM

## 2020-01-01 RX ORDER — TRAZODONE HYDROCHLORIDE 50 MG/1
50 TABLET ORAL NIGHTLY
Status: DISCONTINUED | OUTPATIENT
Start: 2020-01-01 | End: 2020-01-01 | Stop reason: HOSPADM

## 2020-01-01 RX ORDER — VANCOMYCIN HCL IN 5 % DEXTROSE 1.5G/250ML
1500 PLASTIC BAG, INJECTION (ML) INTRAVENOUS ONCE
Status: COMPLETED | OUTPATIENT
Start: 2020-01-01 | End: 2020-01-01

## 2020-01-01 RX ORDER — SODIUM CHLORIDE 0.9 % (FLUSH) 0.9 %
3 SYRINGE (ML) INJECTION
Status: DISCONTINUED | OUTPATIENT
Start: 2020-01-01 | End: 2020-01-01 | Stop reason: HOSPADM

## 2020-01-01 RX ORDER — PREDNISONE 20 MG/1
40 TABLET ORAL DAILY
Status: DISCONTINUED | OUTPATIENT
Start: 2020-01-01 | End: 2020-01-01

## 2020-01-01 RX ORDER — FUROSEMIDE 20 MG/1
20 TABLET ORAL EVERY 8 HOURS
Status: DISCONTINUED | OUTPATIENT
Start: 2020-01-01 | End: 2020-01-01

## 2020-01-01 RX ORDER — CEFEPIME HYDROCHLORIDE 1 G/50ML
2 INJECTION, SOLUTION INTRAVENOUS
Status: DISCONTINUED | OUTPATIENT
Start: 2020-01-01 | End: 2020-01-01

## 2020-01-01 RX ORDER — IPRATROPIUM BROMIDE AND ALBUTEROL SULFATE 2.5; .5 MG/3ML; MG/3ML
3 SOLUTION RESPIRATORY (INHALATION)
Status: COMPLETED | OUTPATIENT
Start: 2020-01-01 | End: 2020-01-01

## 2020-01-01 RX ORDER — GUAIFENESIN 600 MG/1
600 TABLET, EXTENDED RELEASE ORAL 2 TIMES DAILY
Status: DISCONTINUED | OUTPATIENT
Start: 2020-01-01 | End: 2020-01-01

## 2020-01-01 RX ORDER — ASPIRIN 325 MG
325 TABLET ORAL
Status: COMPLETED | OUTPATIENT
Start: 2020-01-01 | End: 2020-01-01

## 2020-01-01 RX ORDER — SODIUM CHLORIDE 9 MG/ML
INJECTION, SOLUTION INTRAVENOUS ONCE
Status: DISCONTINUED | OUTPATIENT
Start: 2020-01-01 | End: 2020-01-01 | Stop reason: HOSPADM

## 2020-01-01 RX ORDER — ACETAMINOPHEN 325 MG/1
650 TABLET ORAL EVERY 6 HOURS PRN
Status: DISCONTINUED | OUTPATIENT
Start: 2020-01-01 | End: 2020-01-01 | Stop reason: HOSPADM

## 2020-01-01 RX ORDER — FUROSEMIDE 10 MG/ML
60 INJECTION INTRAMUSCULAR; INTRAVENOUS
Status: COMPLETED | OUTPATIENT
Start: 2020-01-01 | End: 2020-01-01

## 2020-01-01 RX ORDER — HYDROCODONE BITARTRATE AND ACETAMINOPHEN 500; 5 MG/1; MG/1
TABLET ORAL
Status: DISCONTINUED | OUTPATIENT
Start: 2020-01-01 | End: 2020-01-01 | Stop reason: HOSPADM

## 2020-01-01 RX ORDER — GUAIFENESIN/DEXTROMETHORPHAN 100-10MG/5
5 SYRUP ORAL EVERY 4 HOURS PRN
Status: DISCONTINUED | OUTPATIENT
Start: 2020-01-01 | End: 2020-01-01 | Stop reason: HOSPADM

## 2020-01-01 RX ORDER — LEVOFLOXACIN 750 MG/1
750 TABLET ORAL DAILY
Qty: 5 TABLET | Refills: 0 | Status: SHIPPED | OUTPATIENT
Start: 2020-01-01 | End: 2020-01-01

## 2020-01-01 RX ORDER — BUDESONIDE 0.5 MG/2ML
0.5 INHALANT ORAL EVERY 12 HOURS
Status: DISCONTINUED | OUTPATIENT
Start: 2020-01-01 | End: 2020-01-01 | Stop reason: HOSPADM

## 2020-01-01 RX ORDER — SODIUM CHLORIDE 9 MG/ML
INJECTION, SOLUTION INTRAVENOUS CONTINUOUS PRN
Status: DISCONTINUED | OUTPATIENT
Start: 2020-01-01 | End: 2020-01-01

## 2020-01-01 RX ORDER — LIDOCAINE HYDROCHLORIDE 20 MG/ML
SOLUTION OROPHARYNGEAL
Status: DISCONTINUED | OUTPATIENT
Start: 2020-01-01 | End: 2020-01-01 | Stop reason: HOSPADM

## 2020-01-01 RX ORDER — ONDANSETRON 2 MG/ML
4 INJECTION INTRAMUSCULAR; INTRAVENOUS ONCE
Status: COMPLETED | OUTPATIENT
Start: 2020-01-01 | End: 2020-01-01

## 2020-01-01 RX ORDER — IPRATROPIUM BROMIDE AND ALBUTEROL SULFATE 2.5; .5 MG/3ML; MG/3ML
3 SOLUTION RESPIRATORY (INHALATION)
Status: DISCONTINUED | OUTPATIENT
Start: 2020-01-01 | End: 2020-01-01 | Stop reason: HOSPADM

## 2020-01-01 RX ORDER — LEVOFLOXACIN 750 MG/1
750 TABLET ORAL EVERY OTHER DAY
Status: DISCONTINUED | OUTPATIENT
Start: 2020-01-01 | End: 2020-01-01 | Stop reason: HOSPADM

## 2020-01-01 RX ORDER — FUROSEMIDE 10 MG/ML
20 INJECTION INTRAMUSCULAR; INTRAVENOUS 2 TIMES DAILY
Status: DISCONTINUED | OUTPATIENT
Start: 2020-01-01 | End: 2020-01-01

## 2020-01-01 RX ORDER — TIOTROPIUM BROMIDE 18 UG/1
1 CAPSULE ORAL; RESPIRATORY (INHALATION) DAILY
Status: DISCONTINUED | OUTPATIENT
Start: 2020-01-01 | End: 2020-01-01

## 2020-01-01 RX ORDER — CEFEPIME HYDROCHLORIDE 1 G/50ML
1 INJECTION, SOLUTION INTRAVENOUS
Status: DISCONTINUED | OUTPATIENT
Start: 2020-01-01 | End: 2020-01-01

## 2020-01-01 RX ORDER — FLUTICASONE FUROATE AND VILANTEROL 200; 25 UG/1; UG/1
1 POWDER RESPIRATORY (INHALATION) DAILY
Status: DISCONTINUED | OUTPATIENT
Start: 2020-01-01 | End: 2020-01-01

## 2020-01-01 RX ORDER — SODIUM CHLORIDE 0.9 % (FLUSH) 0.9 %
10 SYRINGE (ML) INJECTION
Status: DISCONTINUED | OUTPATIENT
Start: 2020-01-01 | End: 2020-01-01 | Stop reason: HOSPADM

## 2020-01-01 RX ORDER — ONDANSETRON 4 MG/1
4 TABLET, FILM COATED ORAL EVERY 6 HOURS PRN
Status: DISCONTINUED | OUTPATIENT
Start: 2020-01-01 | End: 2020-01-01 | Stop reason: HOSPADM

## 2020-01-01 RX ORDER — FUROSEMIDE 10 MG/ML
40 INJECTION INTRAMUSCULAR; INTRAVENOUS ONCE
Status: DISCONTINUED | OUTPATIENT
Start: 2020-01-01 | End: 2020-01-01

## 2020-01-01 RX ORDER — FUROSEMIDE 10 MG/ML
40 INJECTION INTRAMUSCULAR; INTRAVENOUS ONCE AS NEEDED
Status: DISCONTINUED | OUTPATIENT
Start: 2020-01-01 | End: 2020-01-01 | Stop reason: HOSPADM

## 2020-01-01 RX ADMIN — CEFEPIME HYDROCHLORIDE 2 G: 2 INJECTION, SOLUTION INTRAVENOUS at 11:05

## 2020-01-01 RX ADMIN — PROMETHAZINE HYDROCHLORIDE 6.25 MG: 25 INJECTION INTRAMUSCULAR; INTRAVENOUS at 04:05

## 2020-01-01 RX ADMIN — METHYLPREDNISOLONE SODIUM SUCCINATE 80 MG: 40 INJECTION, POWDER, FOR SOLUTION INTRAMUSCULAR; INTRAVENOUS at 10:05

## 2020-01-01 RX ADMIN — IPRATROPIUM BROMIDE AND ALBUTEROL SULFATE 3 ML: .5; 3 SOLUTION RESPIRATORY (INHALATION) at 08:05

## 2020-01-01 RX ADMIN — ONDANSETRON HYDROCHLORIDE 4 MG: 4 TABLET, FILM COATED ORAL at 06:05

## 2020-01-01 RX ADMIN — IPRATROPIUM BROMIDE AND ALBUTEROL SULFATE 3 ML: .5; 3 SOLUTION RESPIRATORY (INHALATION) at 04:05

## 2020-01-01 RX ADMIN — IPRATROPIUM BROMIDE AND ALBUTEROL SULFATE 3 ML: .5; 3 SOLUTION RESPIRATORY (INHALATION) at 02:05

## 2020-01-01 RX ADMIN — METHYLPREDNISOLONE SODIUM SUCCINATE 40 MG: 40 INJECTION, POWDER, FOR SOLUTION INTRAMUSCULAR; INTRAVENOUS at 02:05

## 2020-01-01 RX ADMIN — IPRATROPIUM BROMIDE AND ALBUTEROL SULFATE 3 ML: .5; 3 SOLUTION RESPIRATORY (INHALATION) at 05:05

## 2020-01-01 RX ADMIN — PREDNISONE 40 MG: 20 TABLET ORAL at 08:05

## 2020-01-01 RX ADMIN — TRAZODONE HYDROCHLORIDE 50 MG: 50 TABLET ORAL at 09:05

## 2020-01-01 RX ADMIN — ASPIRIN 325 MG: 325 TABLET ORAL at 06:05

## 2020-01-01 RX ADMIN — ARFORMOTEROL TARTRATE 15 MCG: 15 SOLUTION RESPIRATORY (INHALATION) at 07:05

## 2020-01-01 RX ADMIN — CEFEPIME HYDROCHLORIDE 2 G: 2 INJECTION, SOLUTION INTRAVENOUS at 08:05

## 2020-01-01 RX ADMIN — PIPERACILLIN AND TAZOBACTAM 4.5 G: 4; .5 INJECTION, POWDER, LYOPHILIZED, FOR SOLUTION INTRAVENOUS; PARENTERAL at 06:05

## 2020-01-01 RX ADMIN — CEFEPIME HYDROCHLORIDE 2 G: 2 INJECTION, SOLUTION INTRAVENOUS at 02:05

## 2020-01-01 RX ADMIN — IPRATROPIUM BROMIDE AND ALBUTEROL SULFATE 3 ML: .5; 3 SOLUTION RESPIRATORY (INHALATION) at 09:05

## 2020-01-01 RX ADMIN — IPRATROPIUM BROMIDE AND ALBUTEROL SULFATE 3 ML: .5; 3 SOLUTION RESPIRATORY (INHALATION) at 01:05

## 2020-01-01 RX ADMIN — BUDESONIDE 0.5 MG: 0.5 INHALANT RESPIRATORY (INHALATION) at 08:05

## 2020-01-01 RX ADMIN — METHYLPREDNISOLONE SODIUM SUCCINATE 80 MG: 40 INJECTION, POWDER, FOR SOLUTION INTRAMUSCULAR; INTRAVENOUS at 06:05

## 2020-01-01 RX ADMIN — ARFORMOTEROL TARTRATE 15 MCG: 15 SOLUTION RESPIRATORY (INHALATION) at 08:05

## 2020-01-01 RX ADMIN — METHYLPREDNISOLONE SODIUM SUCCINATE 40 MG: 40 INJECTION, POWDER, FOR SOLUTION INTRAMUSCULAR; INTRAVENOUS at 09:05

## 2020-01-01 RX ADMIN — TRAZODONE HYDROCHLORIDE 50 MG: 50 TABLET ORAL at 08:05

## 2020-01-01 RX ADMIN — Medication 1500 MG: at 08:05

## 2020-01-01 RX ADMIN — SODIUM CHLORIDE: 0.9 INJECTION, SOLUTION INTRAVENOUS at 08:05

## 2020-01-01 RX ADMIN — GUAIFENESIN AND DEXTROMETHORPHAN 5 ML: 100; 10 SYRUP ORAL at 06:05

## 2020-01-01 RX ADMIN — ONDANSETRON HYDROCHLORIDE 4 MG: 4 TABLET, FILM COATED ORAL at 03:05

## 2020-01-01 RX ADMIN — VANCOMYCIN HYDROCHLORIDE 1250 MG: 1.25 INJECTION, POWDER, LYOPHILIZED, FOR SOLUTION INTRAVENOUS at 11:05

## 2020-01-01 RX ADMIN — LEVOFLOXACIN 750 MG: 750 TABLET, FILM COATED ORAL at 10:05

## 2020-01-01 RX ADMIN — ONDANSETRON 4 MG: 2 INJECTION INTRAMUSCULAR; INTRAVENOUS at 08:05

## 2020-01-01 RX ADMIN — FUROSEMIDE 20 MG: 10 INJECTION, SOLUTION INTRAMUSCULAR; INTRAVENOUS at 08:05

## 2020-01-01 RX ADMIN — VANCOMYCIN HYDROCHLORIDE 1250 MG: 1.25 INJECTION, POWDER, LYOPHILIZED, FOR SOLUTION INTRAVENOUS at 12:05

## 2020-01-01 RX ADMIN — CEFEPIME HYDROCHLORIDE 2 G: 2 INJECTION, SOLUTION INTRAVENOUS at 12:05

## 2020-01-01 RX ADMIN — ACETAMINOPHEN 650 MG: 325 TABLET ORAL at 12:05

## 2020-01-01 RX ADMIN — BUDESONIDE 0.5 MG: 0.5 INHALANT RESPIRATORY (INHALATION) at 09:05

## 2020-01-01 RX ADMIN — METHYLPREDNISOLONE SODIUM SUCCINATE 40 MG: 40 INJECTION, POWDER, FOR SOLUTION INTRAMUSCULAR; INTRAVENOUS at 08:05

## 2020-01-01 RX ADMIN — CEFEPIME HYDROCHLORIDE 2 G: 2 INJECTION, SOLUTION INTRAVENOUS at 04:05

## 2020-01-01 RX ADMIN — PROPOFOL 70 MG: 10 INJECTION, EMULSION INTRAVENOUS at 08:05

## 2020-01-01 RX ADMIN — FUROSEMIDE 20 MG: 10 INJECTION, SOLUTION INTRAMUSCULAR; INTRAVENOUS at 05:05

## 2020-01-01 RX ADMIN — BUDESONIDE 0.5 MG: 0.5 INHALANT RESPIRATORY (INHALATION) at 07:05

## 2020-01-01 RX ADMIN — ONDANSETRON HYDROCHLORIDE 4 MG: 4 TABLET, FILM COATED ORAL at 02:05

## 2020-01-01 RX ADMIN — ARFORMOTEROL TARTRATE 15 MCG: 15 SOLUTION RESPIRATORY (INHALATION) at 09:05

## 2020-01-01 RX ADMIN — CEFEPIME HYDROCHLORIDE 1 G: 1 INJECTION, SOLUTION INTRAVENOUS at 10:05

## 2020-01-01 RX ADMIN — IPRATROPIUM BROMIDE AND ALBUTEROL SULFATE 3 ML: .5; 3 SOLUTION RESPIRATORY (INHALATION) at 07:05

## 2020-01-01 RX ADMIN — CEFEPIME HYDROCHLORIDE 2 G: 2 INJECTION, SOLUTION INTRAVENOUS at 10:05

## 2020-01-01 RX ADMIN — BUDESONIDE 0.5 MG: 0.5 INHALANT RESPIRATORY (INHALATION) at 01:05

## 2020-01-01 RX ADMIN — FUROSEMIDE 20 MG: 10 INJECTION, SOLUTION INTRAMUSCULAR; INTRAVENOUS at 09:05

## 2020-01-01 RX ADMIN — FUROSEMIDE 20 MG: 20 TABLET ORAL at 03:05

## 2020-01-01 RX ADMIN — FUROSEMIDE 60 MG: 10 INJECTION, SOLUTION INTRAMUSCULAR; INTRAVENOUS at 06:05

## 2020-01-01 RX ADMIN — PREDNISONE 40 MG: 20 TABLET ORAL at 09:05

## 2020-01-01 RX ADMIN — PROPOFOL 20 MG: 10 INJECTION, EMULSION INTRAVENOUS at 08:05

## 2020-01-01 RX ADMIN — CEFEPIME HYDROCHLORIDE 2 G: 2 INJECTION, SOLUTION INTRAVENOUS at 01:05

## 2020-01-01 RX ADMIN — METHYLPREDNISOLONE SODIUM SUCCINATE 80 MG: 40 INJECTION, POWDER, FOR SOLUTION INTRAMUSCULAR; INTRAVENOUS at 02:05

## 2020-04-14 PROBLEM — R79.89 ELEVATED SERUM CREATININE: Status: ACTIVE | Noted: 2020-01-01

## 2020-04-14 PROBLEM — D64.9 SYMPTOMATIC ANEMIA: Status: ACTIVE | Noted: 2020-01-01

## 2020-04-14 PROBLEM — J84.89 INTERSTITIAL PNEUMONITIS: Status: ACTIVE | Noted: 2020-01-01

## 2020-04-17 PROBLEM — J44.1 COPD EXACERBATION: Status: ACTIVE | Noted: 2020-01-01

## 2020-05-03 PROBLEM — J90 PLEURAL EFFUSION: Status: ACTIVE | Noted: 2020-01-01

## 2020-05-03 PROBLEM — N18.30 CKD (CHRONIC KIDNEY DISEASE) STAGE 3, GFR 30-59 ML/MIN: Status: ACTIVE | Noted: 2020-01-01

## 2020-05-03 PROBLEM — R60.0 EDEMA OF BOTH LOWER EXTREMITIES: Status: ACTIVE | Noted: 2020-01-01

## 2020-05-03 PROBLEM — I27.20 PULMONARY HYPERTENSION: Status: ACTIVE | Noted: 2020-01-01

## 2020-05-03 PROBLEM — J96.21 ACUTE ON CHRONIC RESPIRATORY FAILURE WITH HYPOXEMIA: Status: ACTIVE | Noted: 2020-01-01

## 2020-05-05 PROBLEM — J44.1 COPD EXACERBATION: Status: RESOLVED | Noted: 2020-01-01 | Resolved: 2020-01-01

## 2020-05-05 PROBLEM — R60.0 EDEMA OF BOTH LOWER EXTREMITIES: Status: RESOLVED | Noted: 2020-01-01 | Resolved: 2020-01-01

## 2020-05-05 PROBLEM — J96.21 ACUTE ON CHRONIC RESPIRATORY FAILURE WITH HYPOXEMIA: Status: RESOLVED | Noted: 2020-01-01 | Resolved: 2020-01-01

## 2020-05-05 PROBLEM — N18.30 CKD (CHRONIC KIDNEY DISEASE) STAGE 3, GFR 30-59 ML/MIN: Status: RESOLVED | Noted: 2020-01-01 | Resolved: 2020-01-01

## 2020-05-18 PROBLEM — R06.02 SOB (SHORTNESS OF BREATH): Status: ACTIVE | Noted: 2020-01-01

## 2020-05-18 PROBLEM — Z20.822 SUSPECTED COVID-19 VIRUS INFECTION: Status: ACTIVE | Noted: 2020-01-01

## 2020-05-18 PROBLEM — R04.2 COUGH WITH HEMOPTYSIS: Status: ACTIVE | Noted: 2020-01-01

## 2020-05-18 PROBLEM — J44.1 COPD EXACERBATION: Status: ACTIVE | Noted: 2020-01-01

## 2020-05-18 PROBLEM — D72.829 LEUKOCYTOSIS: Status: ACTIVE | Noted: 2020-01-01

## 2020-05-20 PROBLEM — Z20.822 SUSPECTED COVID-19 VIRUS INFECTION: Status: RESOLVED | Noted: 2020-01-01 | Resolved: 2020-01-01

## 2020-05-20 PROBLEM — G47.33 OSA ON CPAP: Status: ACTIVE | Noted: 2020-01-01

## 2020-05-20 PROBLEM — I50.31 ACUTE DIASTOLIC CONGESTIVE HEART FAILURE: Status: ACTIVE | Noted: 2020-01-01

## 2020-05-21 PROBLEM — J18.9 PNEUMONIA: Status: ACTIVE | Noted: 2020-01-01

## 2020-05-23 PROBLEM — J96.01 ACUTE RESPIRATORY FAILURE WITH HYPOXIA: Status: ACTIVE | Noted: 2020-01-01

## 2020-05-23 PROBLEM — N18.30 STAGE 3 CHRONIC KIDNEY DISEASE: Status: ACTIVE | Noted: 2020-01-01

## 2020-05-23 PROBLEM — N17.9 ACUTE RENAL FAILURE SUPERIMPOSED ON STAGE 3 CHRONIC KIDNEY DISEASE: Status: ACTIVE | Noted: 2020-01-01

## 2020-05-23 PROBLEM — N18.30 ACUTE RENAL FAILURE SUPERIMPOSED ON STAGE 3 CHRONIC KIDNEY DISEASE: Status: ACTIVE | Noted: 2020-01-01

## 2020-05-23 NOTE — ED PROVIDER NOTES
"SCRIBE #1 NOTE: I, Jessenia Antonieta, am scribing for, and in the presence of, Gary Philippe Jr., MD. I have scribed the entire note.       History     Chief Complaint   Patient presents with    Shortness of Breath     PT states, I was discharged from [facility] in [place] on Thursday and I was short of breath and coughed up blood all night, I ma having chest pains too."     Review of patient's allergies indicates:  No Known Allergies      History of Present Illness     HPI    5/23/2020, 4:51 PM  History obtained from the patient      History of Present Illness: David Valera is a 60 y.o. male patient with a PMHx of tobacco abuse, emphysema of lung, CHF, and COPD who presents to the Emergency Department for evaluation of SOB which onset gradually abut 2 days ago. Symptoms are worsening and moderate in severity. No mitigating or exacerbating factors reported. Associated sxs include chest tightness and hemoptysis. Patient denies any fever, n/v/d, chills, rash, palpitations, rhinorrhea, sore throat, myalgias, and all other sxs at this time. Pt was released from [facility] [place] 2 days ago. No further complaints or concerns at this time.       Arrival mode: Personal vehicle     PCP: Rodrigo Ames MD        Past Medical History:  Past Medical History:   Diagnosis Date    CHF (congestive heart failure)     Emphysema of lung        Past Surgical History:  Past Surgical History:   Procedure Laterality Date    HIP FRACTURE SURGERY           Family History:  History reviewed. No pertinent family history.    Social History:  Social History     Tobacco Use    Smoking status: Current Every Day Smoker     Packs/day: 1.00     Years: 25.00     Pack years: 25.00    Smokeless tobacco: Never Used   Substance and Sexual Activity    Alcohol use: Not Currently    Drug use: Unknown    Sexual activity: Unknown        Review of Systems     Review of Systems   Constitutional: Negative for fever.   HENT: Negative for rhinorrhea and " sore throat.    Respiratory: Positive for cough (hemoptysis), chest tightness and shortness of breath.    Cardiovascular: Negative for chest pain and palpitations.   Gastrointestinal: Negative for diarrhea, nausea and vomiting.   Genitourinary: Negative for dysuria.   Musculoskeletal: Negative for back pain and myalgias.   Skin: Negative for rash.   Neurological: Negative for weakness.   Hematological: Does not bruise/bleed easily.   All other systems reviewed and are negative.       Physical Exam     Initial Vitals [05/23/20 1642]   BP Pulse Resp Temp SpO2   (!) 102/59 99 (!) 22 98 °F (36.7 °C) (!) 90 %      MAP       --          Physical Exam  Nursing Notes and Vital Signs Reviewed.  Constitutional: Patient is in moderate distress. Well-developed and well-nourished.  Head: Atraumatic. Normocephalic.  Eyes: . EOM intact. Conjunctivae are not pale. No scleral icterus.  ENT: Mucous membranes are moist. Oropharynx is clear and symmetric.  nares clear  Neck: Supple. Full ROM. No lymphadenopathy.  Cardiovascular: Regular rate. Regular rhythm. No murmurs, rubs, or gallops. Distal pulses are 2+ and symmetric.  Mild JVD  Pulmonary/Chest: Tachypnic.  Course in all lung fields. Decreased breath sounds bilaterally. No respiratory distress. Clear to auscultation bilaterally. No rales.  Abdominal: Soft and non-distended.  There is no tenderness.  No rebound, guarding, or rigidity. Good bowel sounds.  Genitourinary: No CVA tenderness.  No suprapubic tenderness  Musculoskeletal: Moves all extremities. No obvious deformities. No edema. No calf tenderness.  Skin: Warm and dry.  Neurological:  Alert, awake, and appropriate.  Normal speech.  No acute focal neurological deficits are appreciated.  Two through 12 intact bilaterally  Psychiatric: Normal affect. Good eye contact. Appropriate in content.     ED Course   Critical Care  Date/Time: 5/23/2020 6:38 PM  Performed by: Gary Philippe Jr., MD  Authorized by: Gary Philippe Jr.,  "MD   Direct patient critical care time: 10 minutes  Additional history critical care time: 10 minutes  Ordering / reviewing critical care time: 5 minutes  Documentation critical care time: 5 minutes  Total critical care time (exclusive of procedural time) : 30 minutes  Critical care time was exclusive of separately billable procedures and treating other patients and teaching time.  Critical care was necessary to treat or prevent imminent or life-threatening deterioration of the following conditions: acute respiratory failure with hypoxia.  Critical care was time spent personally by me on the following activities: blood draw for specimens, development of treatment plan with patient or surrogate, interpretation of cardiac output measurements, evaluation of patient's response to treatment, examination of patient, obtaining history from patient or surrogate, ordering and performing treatments and interventions, ordering and review of laboratory studies, ordering and review of radiographic studies, pulse oximetry, re-evaluation of patient's condition and review of old charts.        ED Vital Signs:  Vitals:    05/23/20 1642 05/23/20 1654 05/23/20 1720 05/23/20 1728   BP: (!) 102/59 115/65     Pulse: 99 105 100 97   Resp: (!) 22 (!) 22  18   Temp: 98 °F (36.7 °C)      TempSrc: Oral      SpO2: (!) 90% 96%  97%   Weight:  80.4 kg (177 lb 4 oz)     Height: 5' 3" (1.6 m)       05/23/20 1734 05/23/20 1738 05/23/20 1757   BP:      Pulse: 95 95 102   Resp: 18 16 20   Temp:      TempSrc:      SpO2: 100% 100% (!) 93%   Weight:      Height:          Abnormal Lab Results:  Labs Reviewed   CBC W/ AUTO DIFFERENTIAL - Abnormal; Notable for the following components:       Result Value    WBC 16.97 (*)     RBC 4.11 (*)     Hemoglobin 9.1 (*)     Hematocrit 31.3 (*)     Mean Corpuscular Volume 76 (*)     Mean Corpuscular Hemoglobin 22.1 (*)     Mean Corpuscular Hemoglobin Conc 29.1 (*)     RDW 18.3 (*)     Platelets 383 (*)     Gran # " (ANC) 13.0 (*)     Immature Grans (Abs) 0.08 (*)     Mono # 1.3 (*)     Gran% 76.8 (*)     Lymph% 12.1 (*)     All other components within normal limits   COMPREHENSIVE METABOLIC PANEL - Abnormal; Notable for the following components:    Glucose 117 (*)     BUN, Bld 32 (*)     Creatinine 1.9 (*)     Albumin 3.4 (*)     eGFR if  43 (*)     eGFR if non  37 (*)     All other components within normal limits   B-TYPE NATRIURETIC PEPTIDE - Abnormal; Notable for the following components:     (*)     All other components within normal limits   CULTURE, BLOOD   CULTURE, BLOOD   LACTIC ACID, PLASMA   TROPONIN I   PROTIME-INR   APTT   SARS-COV-2 RNA AMPLIFICATION, QUAL        All Lab Results:  Results for orders placed or performed during the hospital encounter of 05/23/20   CBC auto differential   Result Value Ref Range    WBC 16.97 (H) 3.90 - 12.70 K/uL    RBC 4.11 (L) 4.60 - 6.20 M/uL    Hemoglobin 9.1 (L) 14.0 - 18.0 g/dL    Hematocrit 31.3 (L) 40.0 - 54.0 %    Mean Corpuscular Volume 76 (L) 82 - 98 fL    Mean Corpuscular Hemoglobin 22.1 (L) 27.0 - 31.0 pg    Mean Corpuscular Hemoglobin Conc 29.1 (L) 32.0 - 36.0 g/dL    RDW 18.3 (H) 11.5 - 14.5 %    Platelets 383 (H) 150 - 350 K/uL    MPV 9.5 9.2 - 12.9 fL    Immature Granulocytes 0.5 0.0 - 0.5 %    Gran # (ANC) 13.0 (H) 1.8 - 7.7 K/uL    Immature Grans (Abs) 0.08 (H) 0.00 - 0.04 K/uL    Lymph # 2.1 1.0 - 4.8 K/uL    Mono # 1.3 (H) 0.3 - 1.0 K/uL    Eos # 0.4 0.0 - 0.5 K/uL    Baso # 0.07 0.00 - 0.20 K/uL    nRBC 0 0 /100 WBC    Gran% 76.8 (H) 38.0 - 73.0 %    Lymph% 12.1 (L) 18.0 - 48.0 %    Mono% 7.8 4.0 - 15.0 %    Eosinophil% 2.4 0.0 - 8.0 %    Basophil% 0.4 0.0 - 1.9 %    Differential Method Automated    Comprehensive metabolic panel   Result Value Ref Range    Sodium 137 136 - 145 mmol/L    Potassium 4.1 3.5 - 5.1 mmol/L    Chloride 103 95 - 110 mmol/L    CO2 23 23 - 29 mmol/L    Glucose 117 (H) 70 - 110 mg/dL    BUN, Bld 32 (H)  6 - 20 mg/dL    Creatinine 1.9 (H) 0.5 - 1.4 mg/dL    Calcium 9.8 8.7 - 10.5 mg/dL    Total Protein 7.7 6.0 - 8.4 g/dL    Albumin 3.4 (L) 3.5 - 5.2 g/dL    Total Bilirubin 0.5 0.1 - 1.0 mg/dL    Alkaline Phosphatase 65 55 - 135 U/L    AST 25 10 - 40 U/L    ALT 26 10 - 44 U/L    Anion Gap 11 8 - 16 mmol/L    eGFR if African American 43 (A) >60 mL/min/1.73 m^2    eGFR if non African American 37 (A) >60 mL/min/1.73 m^2   Lactic acid, plasma   Result Value Ref Range    Lactate (Lactic Acid) 1.7 0.5 - 2.2 mmol/L   Troponin I   Result Value Ref Range    Troponin I 0.026 0.000 - 0.026 ng/mL   B-Type natriuretic peptide (BNP)   Result Value Ref Range     (H) 0 - 99 pg/mL   Protime-INR   Result Value Ref Range    Prothrombin Time 11.5 9.0 - 12.5 sec    INR 1.1 0.8 - 1.2   APTT   Result Value Ref Range    aPTT 29.3 21.0 - 32.0 sec   COVID-19 Rapid Screening   Result Value Ref Range    SARS-CoV-2 RNA, Amplification, Qual Negative Negative         Imaging Results:  Imaging Results          X-Ray Chest AP Portable (SOB) (Final result)  Result time 05/23/20 18:29:12    Final result by Mason Khan MD (05/23/20 18:29:12)                 Impression:      Interval development of peripheral consolidation involving the left midlung zone compatible with pneumonia.      Electronically signed by: Mason Khan MD  Date:    05/23/2020  Time:    18:29             Narrative:    EXAMINATION:  XR CHEST AP PORTABLE    CLINICAL HISTORY:  Cough.  Acute respiratory failure., SOB;    COMPARISON:  05/18/2020.    FINDINGS:  There has been development of a peripheral left infiltrate.  Peripheral consolidation consistent with pneumonia.  The heart size is upper limit of normal.    Right-sided infiltrate appears improved.  Blunting of the costophrenic angle may represent small effusions.                                 The EKG was ordered, reviewed, and independently interpreted by the ED provider.  Interpretation time: 1643  Rate:  100 BPM  Rhythm: normal sinus rhythm  Interpretation: Rightward axis. No STEMI.           The Emergency Provider reviewed the vital signs and test results, which are outlined above.     ED Discussion     6:46 PM: Discussed case with Ernestina Ornelas NP ([facility]). Dr. Baird agrees with current care and management of pt and accepts admission.   Admitting Service: [facility]  Admitting Physician: Dr. Baird  Admit to: obs tele    6:50 PM: Re-evaluated pt. I have discussed test results, shared treatment plan, and the need for admission with patient and family at bedside. Pt and family express understanding at this time and agree with all information. All questions answered. Pt and family have no further questions or concerns at this time. Pt is ready for admit.    6:57 PM  Patient is stabilized well on oxygen and breathing treatments.  It has a new development of a left-sided infiltrate with proven right infiltrate from his previous admission.  Patient also has a mild bump in his troponin and BNP well as hypoxia off oxygen in low 80s.  Patient will be admitted for further treatment.  I discussed all findings with the patient as well as the plan of care.  He verbalizes agreement and understanding.     Medical Decision Making:   Clinical Tests:   Lab Tests: Ordered and Reviewed  Radiological Study: Ordered and Reviewed  Medical Tests: Ordered and Reviewed           ED Medication(s):  Medications   piperacillin-tazobactam 4.5 g in dextrose 5 % 100 mL IVPB (ready to mix system) (has no administration in time range)   vancomycin - pharmacy to dose (has no administration in time range)   vancomycin 1.5 g in 5 % dextrose 250 mL IVPB (has no administration in time range)   albuterol-ipratropium 2.5 mg-0.5 mg/3 mL nebulizer solution 3 mL (3 mLs Nebulization Given 5/23/20 4604)   furosemide injection 60 mg (60 mg Intravenous Given 5/23/20 1815)   aspirin tablet 325 mg (325 mg Oral Given 5/23/20 1815)        New Prescriptions    No medications on file               Scribe Attestation:   Scribe #1: I performed the above scribed service and the documentation accurately describes the services I performed. I attest to the accuracy of the note.     Attending:   Physician Attestation Statement for Scribe #1: I, Gary Philippe Jr., MD, personally performed the services described in this documentation, as scribed by Jessenia Fung, in my presence, and it is both accurate and complete.           Clinical Impression       ICD-10-CM ICD-9-CM   1. Acute respiratory failure with hypoxia J96.01 518.81   2. SOB (shortness of breath) R06.02 786.05   3. Dyspnea R06.00 786.09   4. Acute congestive heart failure, unspecified heart failure type I50.9 428.0   5. Troponin I above reference range R79.89 790.6   6. Pneumonia of left lower lobe due to infectious organism J18.1 486       Disposition:   Disposition: Placed in Observation  Condition: Fair         Gary Philippe Jr., MD  05/23/20 3534

## 2020-05-23 NOTE — ED NOTES
Pt states felling much better after breathing tx; pt respirations now even, unlabored, regular depth and patter, no distress noted. Supplemental O2 tritiated to 2L. Will continue to monitor.

## 2020-05-23 NOTE — PROGRESS NOTES
breathing treatment being administered . Patient talking on phone with no respiratory distress noted at this time  . Productive cough

## 2020-05-24 NOTE — NURSING
"Secure chat message sent to Dr. John:      "Pt c/o SOB and has increased work of breathing. Tachypneic at RR=40; lungs sound coarse. O2 sat=91-92% on 2 LPM NC. Clement RODRIGUEZ assessed pt and requested I message you."    "

## 2020-05-24 NOTE — PROGRESS NOTES
Pharmacokinetic Initial Assessment: IV Vancomycin    Assessment/Plan:    Initiate intravenous vancomycin with loading dose of 1500 mg once with subsequent doses when random concentrations are less than 20 mcg/mL  Desired empiric serum trough concentration is 15 to 20 mcg/mL  Draw vancomycin random level on 5/24 at 20:30.  Pharmacy will continue to follow and monitor vancomycin.      Please contact pharmacy at extension [phone] with any questions regarding this assessment.     Thank you for the consult,   Gerald Duque       Patient brief summary:  David Valera is a 60 y.o. male initiated on antimicrobial therapy with IV Vancomycin for treatment of suspected pneumonia     Drug Allergies:   Review of patient's allergies indicates:  No Known Allergies    Actual Body Weight:   80.3kg    Renal Function:   Estimated Creatinine Clearance: 38.8 mL/min (A) (based on SCr of 1.9 mg/dL (H)).,     Dialysis Method (if applicable):  N/A    CBC (last 72 hours):  Recent Labs   Lab Result Units 05/21/20  0448 05/23/20  1711   WBC K/uL 14.48* 16.97*   Hemoglobin g/dL 8.6* 9.1*   Hematocrit % 27.8* 31.3*   Platelets K/uL 308 383*   Gran% % 70.5 76.8*   Lymph% % 15.6* 12.1*   Mono% % 9.1 7.8   Eosinophil% % 3.9 2.4   Basophil% % 0.4 0.4   Differential Method  Automated Automated       Metabolic Panel (last 72 hours):  Recent Labs   Lab Result Units 05/21/20  0448 05/23/20  1711   Sodium mmol/L 134* 137   Potassium mmol/L 3.8 4.1   Chloride mmol/L 102 103   CO2 mmol/L 25 23   Glucose mg/dL 104 117*   BUN, Bld mg/dL 29* 32*   Creatinine mg/dL 1.4 1.9*   Albumin g/dL 2.7* 3.4*   Total Bilirubin mg/dL 0.5 0.5   Alkaline Phosphatase U/L 56 65   AST U/L 17 25   ALT U/L 31 26       Drug levels (last 3 results):  No results for input(s): VANCOMYCINRA, VANCOMYCINPE, VANCOMYCINTR in the last 72 hours.    Microbiologic Results:  Microbiology Results (last 7 days)       Procedure Component Value Units Date/Time    Blood culture #1 [[id number]]  Collected:  05/23/20 1712    Order Status:  Sent Specimen:  Blood from Peripheral, Wrist, Left Updated:  05/24/20 0127    Blood culture #2 [[id number]] Collected:  05/23/20 1731    Order Status:  Sent Specimen:  Blood from Peripheral, Upper Arm, Left Updated:  05/24/20 0127    Influenza A & B by Molecular [[id number]] Collected:  05/23/20 2039    Order Status:  Completed Specimen:  Nasopharyngeal Swab Updated:  05/23/20 2219     Influenza A, Molecular Negative     Influenza B, Molecular Negative     Flu A & B Source Nasal swab    Culture, MRSA [[id number]]     Order Status:  No result Specimen:  MRSA source from Nares, Left     Culture, Respiratory with Gram Stain [[id number]]     Order Status:  No result Specimen:  Respiratory

## 2020-05-24 NOTE — PROGRESS NOTES
Pharmacist Renal Dose Adjustment Note    David Valera is a 60 y.o. male being treated with the medication Cefepime    Patient Data:    Vital Signs (Most Recent):  Temp: 97.3 °F (36.3 °C) (05/24/20 0740)  Pulse: (c/o SOB ) (05/24/20 0840)  Resp: 16 (05/24/20 0740)  BP: 93/65 (05/24/20 0740)  SpO2: (!) 94 % (05/24/20 0740)   Vital Signs (72h Range):  Temp:  [97 °F (36.1 °C)-98 °F (36.7 °C)]   Pulse:  []   Resp:  [16-32]   BP: ()/(58-72)   SpO2:  [89 %-100 %]      Recent Labs   Lab 05/21/20  0448 05/23/20  1711 05/24/20  0437   CREATININE 1.4 1.9* 1.8*     Serum creatinine: 1.8 mg/dL (H) 05/24/20 0437  Estimated creatinine clearance: 40.2 mL/min (A)    Medication:Cefepime dose: 1g frequency q24 will be changed to medication:Cefepime dose:2g frequency:q12    Pharmacist's Name: Hilda Boyer  Pharmacist's Extension: [phone]

## 2020-05-24 NOTE — PLAN OF CARE
Dx: Acute respiratory failure with hypoxia    POC: monitor VS and breathing; administer scheduled meds    Bed remains low and locked, side rails up x 2, call bell and belongings within reach.    Clutter removed from room, lighting adjusted when rounding.    Frequent repositioning encouraged to prevent pressure injury. - done independently by patient.    IV antibiotics administered as ordered.    IPain frequently monitored, interventions implemented as ordered and appropriate.    VS taken Q4 hours per unit routine.     Cardiac monitor in place: sinus tachycardia    Educated pt on plan of care. Pt is agreeable and verbalized understanding. Will continue to monitor.

## 2020-05-24 NOTE — PLAN OF CARE
RT educated pt on indication and goals of nebs and oxygen. Along with proper breathing tech ( pursed lip breathing on excerption).

## 2020-05-24 NOTE — HPI
David Valera is a 60 y.o. AAM with PMH of COPD, CKD, HTN who is presenting as c/o SOB. He states he is a  and drives often long distances. He was recently admitted to the hospital for similar complaints however he had hemoptysis along with SOB. TB and PE were ruled out at that time.treated with levaquin and discharge yesterday but he continue to have shortness of breath . In ed patient was found to have leukocytosis . cxr shows left mid lung consolidation .started on vanco /cefepime respiratiory cultures . Patient was also given dose of lasix 60 mg in ed

## 2020-05-24 NOTE — ASSESSMENT & PLAN NOTE
-non resolving pneumonia failed levaquin   -started vanc/cefepime   -resp culture blood culture   -procal,nasal mrsa   -cxr shows left sided consolidation

## 2020-05-24 NOTE — HOSPITAL COURSE
5/24/20:  Pt continues to be sob, which he reports started 5 weeks ago and has led to 3 admissions in [facility], where he was r/o for PE and TB and he was last DC'd home on levofloxacin, but continued to have worsening sob, cough and LE edema.   - Encouraged to quit smoking completely and to avoid all foods or drinks with >190 mg sodium/serving.    5/25/20:  Pt continue to have sob, particularly with minimal ambulation. Discussed healthy lifestyle modifications in detail with pt and his niece, who is a nurse. Will restart gentle diuresis given pitting LE edema and elevated BNP to 760  - Pulm consulted given his severe pulmonary HTN (and severe LAE & BLAYNE) RE possible sildenafil and/or other suggestions   - Continue Vanc & Cefepime for now  - Continue to encourage to avoid all foods or drinks with >190 mg sodium/serving and to with >9 carbohydrate / fiber ratio.    5/26/20:  Patient fell last night while up to restroom and hit his head; Head CT unremarkable; no residual pain or deficits since. No further hemoptasis.   - F/U bronchoscopy results once complete  - Consider sildenafil as outpatient  - Continue Vanc/Cefepime    5/27/20:  No events. Patient seen by cardiology, who recommended 2U PRBCs w/ furosemide in between to improve oxygenation.  - F/U further cards recs  5/28/20: No events overnights. Reports much improvement in fatigue and sob since receiving 2U PRBCs.   - NPO after MN for TRUDY in AM for further evaluation of mitral valve  5/30 Pt was seen and examined at bedside . He is s/p TRUDY which show MITRAL STENOSIS,  PHT 1/2- SEVERE, PLANIMETRY: 0.9 ,SEVERE, SEVERE CALCIFICATION VALVULAR, SUBVALVULAR OK . The case was d/w cardiology which  rec to f/u with [facility]  For the severe MS . Pulmonology rec to deescalate antibiotic  And wean off steroid . Pt  Is now on ra  And report feeling  better   5/31 Pt was seen and examined at bedside . He was determined to be suitable for D/C  -TRUDY show severe mitral  stenosis   -Pt will be f/u cardiology  At the [facility]  -WBC trending down . He was d/c on Levaquin   - The kidney function went back to  normal .  -The case was discussed in detail with the patient

## 2020-05-24 NOTE — NURSING
"Pt complaining of not being able to breathe. Stated he "needs oxygen." Noted Spo2 of 82%. Pt stated he had stood up to use the urinal and felt as if he would pass out; stated it became hard to breathe. Titrated oxygen to 4lpm nc. Sats kassi to 91%. After a period of rest (approximately 20 minutes), Spo2 up to 96%. Titrated oxygen down to 3lpm nc. Subsequent Spo2 readings at 94% at rest.   Spoke with NP on call of situation. No new orders given at the time. Patient has been resting comfortably since then.   "

## 2020-05-24 NOTE — SIGNIFICANT EVENT
Received deterioration alert, patient previously experiencing increase shortness of breath with increased respiratory rate. He is currently sitting up in chair. Oxygen increase to 4 L nasal canula. O 2 sats currently in the 90's. Respiratory status improved. Vital signs stable. Attending physician notified and aware.

## 2020-05-24 NOTE — SUBJECTIVE & OBJECTIVE
Past Medical History:   Diagnosis Date    CHF (congestive heart failure)     Emphysema of lung        Past Surgical History:   Procedure Laterality Date    HIP FRACTURE SURGERY         Review of patient's allergies indicates:  No Known Allergies    No current facility-administered medications on file prior to encounter.      Current Outpatient Medications on File Prior to Encounter   Medication Sig    albuterol (PROAIR HFA) 90 mcg/actuation inhaler Inhale 2 puffs into the lungs every 4 (four) hours as needed for wheezing or shortness of breath    albuterol (PROVENTIL/VENTOLIN HFA) 90 mcg/actuation inhaler Inhale 2 puffs into the lungs every 4 (four) hours as needed for Wheezing or Shortness of Breath.    budesonide-formoterol 80-4.5 mcg (SYMBICORT) 80-4.5 mcg/actuation HFAA Inhale 2 puffs into the lungs every 6 to 8 hours as needed. Controller    furosemide (LASIX) 20 MG tablet Take 1 tablet (20 mg total) by mouth once daily.    levoFLOXacin (LEVAQUIN) 750 MG tablet Take 1 tablet (750 mg total) by mouth once daily. for 5 days    traZODone (DESYREL) 50 MG tablet Take 50 mg by mouth nightly.    tiotropium bromide (SPIRIVA RESPIMAT) 2.5 mcg/actuation Mist Inhale 2 puffs into the lungs every 6 to 8 hours as needed. Controller     Family History     None        Tobacco Use    Smoking status: Current Every Day Smoker     Packs/day: 1.00     Years: 25.00     Pack years: 25.00    Smokeless tobacco: Never Used   Substance and Sexual Activity    Alcohol use: Not Currently    Drug use: Not on file    Sexual activity: Not on file     Review of Systems   Constitutional: Negative for activity change and appetite change.   HENT: Negative for congestion, rhinorrhea and sore throat.    Eyes: Negative for discharge and redness.   Respiratory: Positive for cough and shortness of breath. Negative for chest tightness and wheezing.    Cardiovascular: Negative for chest pain and leg swelling.   Gastrointestinal: Negative  for abdominal pain, diarrhea and vomiting.   Endocrine: Negative for cold intolerance, heat intolerance and polyuria.   Genitourinary: Negative for difficulty urinating, dysuria, flank pain, hematuria and urgency.   Musculoskeletal: Negative for arthralgias, back pain and myalgias.   Skin: Negative for color change and pallor.   Allergic/Immunologic: Negative for immunocompromised state.   Neurological: Negative for dizziness, light-headedness and headaches.   Hematological: Negative for adenopathy.   Psychiatric/Behavioral: Negative for agitation and confusion. The patient is not nervous/anxious.      Objective:     Vital Signs (Most Recent):  Temp: 98 °F (36.7 °C) (05/23/20 1925)  Pulse: 103 (05/23/20 1930)  Resp: (!) 29 (05/23/20 1930)  BP: 96/67 (05/23/20 1930)  SpO2: 95 % (05/23/20 1930) Vital Signs (24h Range):  Temp:  [98 °F (36.7 °C)] 98 °F (36.7 °C)  Pulse:  [] 103  Resp:  [16-29] 29  SpO2:  [90 %-100 %] 95 %  BP: ()/(59-67) 96/67     Weight: 80.4 kg (177 lb 4 oz)  Body mass index is 31.4 kg/m².    Physical Exam   Constitutional: He is oriented to person, place, and time. He appears well-developed and well-nourished.   HENT:   Head: Normocephalic and atraumatic.   Eyes: Right eye exhibits no discharge. Left eye exhibits no discharge. No scleral icterus.   Cardiovascular: Normal rate, regular rhythm and normal heart sounds.   Pulmonary/Chest: He is in respiratory distress. He has wheezes. He has rales.   Abdominal: Soft. Bowel sounds are normal. He exhibits no distension.   Musculoskeletal: Normal range of motion.   Lymphadenopathy:     He has no cervical adenopathy.   Neurological: He is alert and oriented to person, place, and time.   Skin: Skin is warm and dry.   Psychiatric: He has a normal mood and affect.   Nursing note and vitals reviewed.          Significant Labs: All pertinent labs within the past 24 hours have been reviewed.    Significant Imaging: I have reviewed all pertinent imaging  results/findings within the past 24 hours.

## 2020-05-24 NOTE — SUBJECTIVE & OBJECTIVE
Interval History: Persistent sob and hemoptasis    Review of Systems   Constitutional: Negative for activity change and appetite change.   HENT: Negative for congestion, rhinorrhea and sore throat.    Eyes: Negative for discharge and redness.   Respiratory: Positive for cough (assoc with hemoptasis) and shortness of breath. Negative for chest tightness and wheezing.    Cardiovascular: Positive for leg swelling. Negative for chest pain.   Gastrointestinal: Negative for abdominal pain, diarrhea and vomiting.   Endocrine: Negative for cold intolerance, heat intolerance and polyuria.   Genitourinary: Negative for difficulty urinating, dysuria, flank pain, hematuria and urgency.   Musculoskeletal: Negative for arthralgias, back pain and myalgias.   Skin: Negative for color change and pallor.   Allergic/Immunologic: Negative for immunocompromised state.   Neurological: Negative for dizziness, light-headedness and headaches.   Hematological: Negative for adenopathy.   Psychiatric/Behavioral: Negative for agitation and confusion. The patient is not nervous/anxious.      Objective:     Vital Signs (Most Recent):  Temp: 98.8 °F (37.1 °C) (05/24/20 1614)  Pulse: 105 (05/24/20 1614)  Resp: 20 (05/24/20 1614)  BP: (!) 94/53 (05/24/20 1614)  SpO2: 97 % (05/24/20 1614) Vital Signs (24h Range):  Temp:  [97 °F (36.1 °C)-98.8 °F (37.1 °C)] 98.8 °F (37.1 °C)  Pulse:  [] 105  Resp:  [16-44] 20  SpO2:  [89 %-100 %] 97 %  BP: ()/(53-72) 94/53     Weight: 77.6 kg (171 lb 1.2 oz)  Body mass index is 30.3 kg/m².    Intake/Output Summary (Last 24 hours) at 5/24/2020 1856  Last data filed at 5/24/2020 1600  Gross per 24 hour   Intake 1026 ml   Output 1000 ml   Net 26 ml      Physical Exam   Constitutional: He is oriented to person, place, and time. He appears well-developed and well-nourished.   HENT:   Head: Normocephalic and atraumatic.   Eyes: Right eye exhibits no discharge. Left eye exhibits no discharge. No scleral icterus.    Cardiovascular: Normal rate, regular rhythm and normal heart sounds.   Pulmonary/Chest: No respiratory distress. He has wheezes. He has rales.   Abdominal: Soft. Bowel sounds are normal. He exhibits no distension.   Musculoskeletal: Normal range of motion.   Lymphadenopathy:     He has no cervical adenopathy.   Neurological: He is alert and oriented to person, place, and time.   Skin: Skin is warm and dry.   Psychiatric: He has a normal mood and affect.   Nursing note and vitals reviewed.      Significant Labs:   CBC:   Recent Labs   Lab 05/23/20 1711 05/24/20 0437   WBC 16.97* 22.02*   HGB 9.1* 7.8*   HCT 31.3* 26.5*   * 361*     CMP:   Recent Labs   Lab 05/23/20 1711 05/24/20 0437    135*   K 4.1 4.3    102   CO2 23 25   * 123*   BUN 32* 30*   CREATININE 1.9* 1.8*   CALCIUM 9.8 9.2   PROT 7.7  --    ALBUMIN 3.4*  --    BILITOT 0.5  --    ALKPHOS 65  --    AST 25  --    ALT 26  --    ANIONGAP 11 8   EGFRNONAA 37* 40*     All pertinent labs within the past 24 hours have been reviewed.    Significant Imaging: I have reviewed all pertinent imaging results/findings within the past 24 hours.

## 2020-05-24 NOTE — PLAN OF CARE
Plan of care reviewed with patient.   Patient verbalized understanding.  Monitor O2 and heart rate and rhythm.   Bed remains low and locked, side rails up x 2, call bell and belongings within reach.   Bedside commode/urinal in room.   Frequent repositioning encouraged to prevent pressure injury.   Instructed to call for assistance as well as for ambulation.   PIV intact. Cardiac monitor in place.  No c/o or questions at this time.   Will continue to monitor.

## 2020-05-24 NOTE — H&P
"[facility] Hospital Medicine  History & Physical    Patient Name: David Valera  MRN: [id number]  Admission Date: 5/23/2020  Attending Physician: Kristina Baird MD   Primary Care Provider: Rodrigo Ames MD         Patient information was obtained from patient and ER records.     Subjective:     Principal Problem:Acute respiratory failure with hypoxia    Chief Complaint:   Chief Complaint   Patient presents with    Shortness of Breath     PT states, I was discharged from [facility] in [place] on Thursday and I was short of breath and coughed up blood all night, I ma having chest pains too."        HPI: David Valera is a 60 y.o. AAM with PMH of COPD, CKD, HTN who is presenting as c/o SOB. He states he is a  and drives often long distances. He was recently admitted to the hospital for similar complaints however he had hemoptysis along with SOB. TB and PE were ruled out at that time.treated with levaquin and discharge yesterday but he continue to have shortness of breath . In ed patient was found to have leukocytosis . cxr shows left mid lung consolidation .started on vanco /cefepime respiratiory cultures . Patient was also given dose of lasix 60 mg in ed      Past Medical History:   Diagnosis Date    CHF (congestive heart failure)     Emphysema of lung        Past Surgical History:   Procedure Laterality Date    HIP FRACTURE SURGERY         Review of patient's allergies indicates:  No Known Allergies    No current facility-administered medications on file prior to encounter.      Current Outpatient Medications on File Prior to Encounter   Medication Sig    albuterol (PROAIR HFA) 90 mcg/actuation inhaler Inhale 2 puffs into the lungs every 4 (four) hours as needed for wheezing or shortness of breath    albuterol (PROVENTIL/VENTOLIN HFA) 90 mcg/actuation inhaler Inhale 2 puffs into the lungs every 4 (four) hours as needed for Wheezing or Shortness of Breath.    " budesonide-formoterol 80-4.5 mcg (SYMBICORT) 80-4.5 mcg/actuation HFAA Inhale 2 puffs into the lungs every 6 to 8 hours as needed. Controller    furosemide (LASIX) 20 MG tablet Take 1 tablet (20 mg total) by mouth once daily.    levoFLOXacin (LEVAQUIN) 750 MG tablet Take 1 tablet (750 mg total) by mouth once daily. for 5 days    traZODone (DESYREL) 50 MG tablet Take 50 mg by mouth nightly.    tiotropium bromide (SPIRIVA RESPIMAT) 2.5 mcg/actuation Mist Inhale 2 puffs into the lungs every 6 to 8 hours as needed. Controller     Family History     None        Tobacco Use    Smoking status: Current Every Day Smoker     Packs/day: 1.00     Years: 25.00     Pack years: 25.00    Smokeless tobacco: Never Used   Substance and Sexual Activity    Alcohol use: Not Currently    Drug use: Not on file    Sexual activity: Not on file     Review of Systems   Constitutional: Negative for activity change and appetite change.   HENT: Negative for congestion, rhinorrhea and sore throat.    Eyes: Negative for discharge and redness.   Respiratory: Positive for cough and shortness of breath. Negative for chest tightness and wheezing.    Cardiovascular: Negative for chest pain and leg swelling.   Gastrointestinal: Negative for abdominal pain, diarrhea and vomiting.   Endocrine: Negative for cold intolerance, heat intolerance and polyuria.   Genitourinary: Negative for difficulty urinating, dysuria, flank pain, hematuria and urgency.   Musculoskeletal: Negative for arthralgias, back pain and myalgias.   Skin: Negative for color change and pallor.   Allergic/Immunologic: Negative for immunocompromised state.   Neurological: Negative for dizziness, light-headedness and headaches.   Hematological: Negative for adenopathy.   Psychiatric/Behavioral: Negative for agitation and confusion. The patient is not nervous/anxious.      Objective:     Vital Signs (Most Recent):  Temp: 98 °F (36.7 °C) (05/23/20 1925)  Pulse: 103 (05/23/20  1930)  Resp: (!) 29 (05/23/20 1930)  BP: 96/67 (05/23/20 1930)  SpO2: 95 % (05/23/20 1930) Vital Signs (24h Range):  Temp:  [98 °F (36.7 °C)] 98 °F (36.7 °C)  Pulse:  [] 103  Resp:  [16-29] 29  SpO2:  [90 %-100 %] 95 %  BP: ()/(59-67) 96/67     Weight: 80.4 kg (177 lb 4 oz)  Body mass index is 31.4 kg/m².    Physical Exam   Constitutional: He is oriented to person, place, and time. He appears well-developed and well-nourished.   HENT:   Head: Normocephalic and atraumatic.   Eyes: Right eye exhibits no discharge. Left eye exhibits no discharge. No scleral icterus.   Cardiovascular: Normal rate, regular rhythm and normal heart sounds.   Pulmonary/Chest: He is in respiratory distress. He has wheezes. He has rales.   Abdominal: Soft. Bowel sounds are normal. He exhibits no distension.   Musculoskeletal: Normal range of motion.   Lymphadenopathy:     He has no cervical adenopathy.   Neurological: He is alert and oriented to person, place, and time.   Skin: Skin is warm and dry.   Psychiatric: He has a normal mood and affect.   Nursing note and vitals reviewed.          Significant Labs: All pertinent labs within the past 24 hours have been reviewed.    Significant Imaging: I have reviewed all pertinent imaging results/findings within the past 24 hours.    Assessment/Plan:     acute on chronic ckd 3  Avoid nephrotoxic drugs       Acute renal failure superimposed on stage 3 chronic kidney disease  Avoids nephrotoxic drugs   Will continue monitor cr       Acute respiratory failure with hypoxia  -pneumonia started broad spectrum antibotics   - po2 66   -nasal canula oxygen       Pneumonia  -non resolving pneumonia failed levaquin   -started vanc/cefepime   -resp culture blood culture   -procal,nasal mrsa   -cxr shows left sided consolidation    BERTHA on CPAP  - cpap at night       COPD exacerbation  Copd pathway   Steroids   Breathing treatments         VTE Risk Mitigation (From admission, onward)         Ordered      IP VTE HIGH RISK PATIENT  Once      05/23/20 1942     Place sequential compression device  Until discontinued      05/23/20 1942                   Kristina Baird MD  Department of Hospital Medicine   [facility]

## 2020-05-25 PROBLEM — I34.2 NONRHEUMATIC MITRAL VALVE STENOSIS WITH INSUFFICIENCY: Status: ACTIVE | Noted: 2020-01-01

## 2020-05-25 PROBLEM — I34.0 NONRHEUMATIC MITRAL VALVE STENOSIS WITH INSUFFICIENCY: Status: ACTIVE | Noted: 2020-01-01

## 2020-05-25 NOTE — PLAN OF CARE
Plan of care discussed with patient. Patient verbalized understanding.   SOB noted on exertion; reminded patient to call for assistance.   Bed remains low and locked, side rails up x 2, call bell and belongings within reach.   Bedside urinal in room.   Frequent repositioning encouraged to prevent pressure injury.    PIV intact. Cardiac monitor in place.  No c/o or questions at this time.   Will continue to monitor.

## 2020-05-25 NOTE — PLAN OF CARE
SW met with patient at bedside to assess for discharge planning.  Patient was alert and oriented.  Patient reported that for the past few weeks, he has been utilizing a nebulizer, but denied the use of any medical/respiratory assistive equipment or home health services before coming to the hospital.  Patient identified his help at home as his wife, and stated that while his daughter does not reside with him, she also provide a lot of support to the patient. The patient reported that he has been in and out of the hospital starting 5 weeks ago.  He stated that he was initially test negative for COVID and discharged, but stated that since that time his conditioned has gotten worse, but they still have not been able to identify the true cause of his issues.  Patient reported being admitted to [facility], and [facility], with no resolve of his symptoms.   Patient denied the need for any assistive equipment, home health services, and all other community resources at this time.  Patient discharge needs are undetermined at this time.  SW provided a transitional care folder, information on advanced directives, information on pharmacy bedside delivery, and discharge planning begins on admission with contact information for any needs/questions.     D/C Plan:  Home  PCP:  Dr. Rodrigo Ames  Preferred Pharmacy:    [facility] [address] [place] [id number]  Phone: [phone] Fax: [phone]    Discharge transportation:  Daughter  My [facility]:  Active  Pharmacy Bedside Delivery:   Yes       05/25/20 1051   Discharge Assessment   Assessment Type Discharge Planning Assessment   Confirmed/corrected address and phone number on facesheet? Yes   Assessment information obtained from? Patient   Expected Length of Stay (days)   (TBD)   Communicated expected length of stay with patient/caregiver yes   Prior to hospitilization cognitive status: Alert/Oriented   Prior to  hospitalization functional status: Independent   Current cognitive status: Alert/Oriented   Current Functional Status: Independent   Facility Arrived From: home   Lives With spouse   Able to Return to Prior Arrangements yes   Is patient able to care for self after discharge? Yes   Who are your caregiver(s) and their phone number(s)? Darlene Valera (spouse) [phone]   Patient's perception of discharge disposition home or selfcare   Readmission Within the Last 30 Days previous discharge plan unsuccessful   If yes, most recent facility name: [facility]    Patient currently being followed by outpatient case management? No   Patient currently receives any other outside agency services? No   Equipment Currently Used at Home nebulizer   Do you have any problems affording any of your prescribed medications? No   Is the patient taking medications as prescribed? yes   Does the patient have transportation home? Yes   Transportation Anticipated family or friend will provide   Dialysis Name and Scheduled days N/A   Does the patient receive services at the [facility]mad[facility] Clinic? No   Discharge Plan A Home with family   DME Needed Upon Discharge    (TBD)   Patient/Family in Agreement with Plan yes   Readmission Questionnaire   At the time of your discharge, did someone talk to you about what your health problems were? Yes   At the time of discharge, did someone talk to you about what to watch out for regarding worsening of your health problem? Yes   At the time of discharge, did someone talk to you about what to do if you experienced worsening of your health problem? Yes   At the time of discharge, did someone talk to you about which medication to take when you left the hospital and which ones to stop taking? Yes   At the time of discharge, did someone talk to you about when and where to follow up with a doctor after you left the hospital? Yes   What do you believe caused you to be sick enough to be re-admitted? I have not  received proper diagnosis and proper treatment for my diagnosis, because they have not figured out what  is going on.     Do you have problems taking your medications as prescribed? No   Do you have any problems affording any of  your prescribed medications? No   Do you have problems obtaining/receiving your medications? No   Does the patient have transportation to healthcare appointments? Yes   Living Arrangements house   Does the patient have family/friends to help with healtcare needs after discharge? yes   Does your caregiver provide all the help you need? Yes   Are you currently feeling confused? No   Are you currently having problems thinking? No   Are you currently having memory problems? No   Have you felt down, depressed, or hopeless? 0   Have you felt little interest or pleasure in doing things? 0   In the last 7 days, my sleep quality was: poor

## 2020-05-25 NOTE — ASSESSMENT & PLAN NOTE
-non resolving pneumonia failed levaquin   -started vanc/cefepime   -resp culture blood culture   -procal,nasal mrsa   -cxr shows left sided consolidation  -Will consider pulm consult if not improving in AM

## 2020-05-25 NOTE — PLAN OF CARE
Plan of care reviewed with patient, he verbalized understanding.  Pt remains free from falls this shift, fall precautions in place.  Pt remains free from skin breakdown, he turns and repositions independently while in bed.  AAOx4, VSS, NAD noted at this time.  Pt remained afebrile.  4L O2 via [place].  ST on the tele monitor.  Pt admitted for resp failure and PNA/fluid overload, prednisone PO and IV abx.  Pt currently resting comfortably in bed.  Hourly rounding complete.  Will continue to monitor.

## 2020-05-25 NOTE — ASSESSMENT & PLAN NOTE
Copd pathway   Steroids   Breathing treatments   - Encouraged to quit smoking completely, which he started at age of 17 years, 1 PPD until age 60 years

## 2020-05-25 NOTE — PROGRESS NOTES
Pharmacokinetic Assessment Follow Up: IV Vancomycin    Vancomycin serum concentration assessment(s):    The random level was drawn correctly and can be used to guide therapy at this time. The measurement is below the desired definitive target range of 15 to 20 mcg/mL.    Vancomycin Regimen Plan:    Change regimen to Vancomycin 1250 mg IV every 24 hours with next serum trough concentration measured at 23:30 prior to 3rd dose on 5/26    Drug levels (last 3 results):  Recent Labs   Lab Result Units 05/24/20 2033   Vancomycin, Random ug/mL 8.1       Pharmacy will continue to follow and monitor vancomycin.    Please contact pharmacy at extension [phone] for questions regarding this assessment.    Thank you for the consult,   Gerald Duque       Patient brief summary:  David Valera is a 60 y.o. male initiated on antimicrobial therapy with IV Vancomycin for treatment of pneumonia      Drug Allergies:   Review of patient's allergies indicates:  No Known Allergies    Actual Body Weight:   77.6kg    Renal Function:   Estimated Creatinine Clearance: 40.2 mL/min (A) (based on SCr of 1.8 mg/dL (H)).,     Dialysis Method (if applicable):  N/A    CBC (last 72 hours):  Recent Labs   Lab Result Units 05/23/20 1711 05/24/20  0437   WBC K/uL 16.97* 22.02*   Hemoglobin g/dL 9.1* 7.8*   Hematocrit % 31.3* 26.5*   Platelets K/uL 383* 361*   Gran% % 76.8* 85.0*   Lymph% % 12.1* 7.0*   Mono% % 7.8 5.9   Eosinophil% % 2.4 1.0   Basophil% % 0.4 0.4   Differential Method  Automated Automated       Metabolic Panel (last 72 hours):  Recent Labs   Lab Result Units 05/23/20  1711 05/24/20  0437   Sodium mmol/L 137 135*   Potassium mmol/L 4.1 4.3   Chloride mmol/L 103 102   CO2 mmol/L 23 25   Glucose mg/dL 117* 123*   BUN, Bld mg/dL 32* 30*   Creatinine mg/dL 1.9* 1.8*   Albumin g/dL 3.4*  --    Total Bilirubin mg/dL 0.5  --    Alkaline Phosphatase U/L 65  --    AST U/L 25  --    ALT U/L 26  --    Magnesium mg/dL  --  2.0   Phosphorus mg/dL   --  4.4       Vancomycin Administrations:  vancomycin given in the last 96 hours                   vancomycin (VANCOCIN) 1,250 mg in dextrose 5 % 250 mL IVPB (mg) 1,250 mg New Bag 05/24/20 2359    vancomycin 1.5 g in 5 % dextrose 250 mL IVPB (mg) 1,500 mg New Bag 05/23/20 2048                Microbiologic Results:  Microbiology Results (last 7 days)     Procedure Component Value Units Date/Time    Culture, Respiratory with Gram Stain [[id number]] Collected:  05/24/20 0803    Order Status:  Completed Specimen:  Respiratory from Sputum, Expectorated Updated:  05/24/20 1556     Gram Stain (Respiratory) >10 epithelial cells per low power field     Gram Stain (Respiratory) Predominance of oropharyngeal isai. Please recollect.    Blood culture #1 [[id number]] Collected:  05/23/20 1712    Order Status:  Completed Specimen:  Blood from Peripheral, Wrist, Left Updated:  05/24/20 1545     Blood Culture, Routine No Growth to date    Blood culture #2 [[id number]] Collected:  05/23/20 1731    Order Status:  Completed Specimen:  Blood from Peripheral, Upper Arm, Left Updated:  05/24/20 1545     Blood Culture, Routine No Growth to date    Culture, MRSA [[id number]] Collected:  05/24/20 0800    Order Status:  Sent Specimen:  MRSA source from Nares, Left Updated:  05/24/20 1308    Influenza A & B by Molecular [[id number]] Collected:  05/23/20 2039    Order Status:  Completed Specimen:  Nasopharyngeal Swab Updated:  05/23/20 2219     Influenza A, Molecular Negative     Influenza B, Molecular Negative     Flu A & B Source Nasal swab

## 2020-05-25 NOTE — SIGNIFICANT EVENT
Patient seen and examined in response to a deterioration alert. His respiratory rate increased while getting up to sit in a chair. Rate improved with rest. Will continue to monitor closely.

## 2020-05-25 NOTE — SUBJECTIVE & OBJECTIVE
Interval History: Patient with clinical deterioration (tachycardia) overnight, which was thought to be 2/2 ambulation, which resolved with rest    Review of Systems   Constitutional: Negative for activity change and appetite change.   HENT: Negative for congestion, rhinorrhea and sore throat.    Eyes: Negative for discharge and redness.   Respiratory: Positive for cough (assoc with hemoptasis) and shortness of breath. Negative for chest tightness and wheezing.    Cardiovascular: Positive for leg swelling. Negative for chest pain.   Gastrointestinal: Negative for abdominal pain, diarrhea and vomiting.   Endocrine: Negative for cold intolerance, heat intolerance and polyuria.   Genitourinary: Negative for difficulty urinating, dysuria, flank pain, hematuria and urgency.   Musculoskeletal: Negative for arthralgias, back pain and myalgias.   Skin: Negative for color change and pallor.   Allergic/Immunologic: Negative for immunocompromised state.   Neurological: Negative for dizziness, light-headedness and headaches.   Hematological: Negative for adenopathy.   Psychiatric/Behavioral: Negative for agitation and confusion. The patient is not nervous/anxious.      Objective:     Vital Signs (Most Recent):  Temp: 97.9 °F (36.6 °C) (05/25/20 0738)  Pulse: 107 (05/25/20 0900)  Resp: 18 (05/25/20 0820)  BP: (!) 97/59 (05/25/20 0738)  SpO2: (!) 94 % (05/25/20 0820) Vital Signs (24h Range):  Temp:  [97.7 °F (36.5 °C)-98.8 °F (37.1 °C)] 97.9 °F (36.6 °C)  Pulse:  [] 107  Resp:  [18-44] 18  SpO2:  [91 %-100 %] 94 %  BP: ()/(53-71) 97/59     Weight: 76.5 kg (168 lb 10.4 oz)  Body mass index is 29.88 kg/m².    Intake/Output Summary (Last 24 hours) at 5/25/2020 1022  Last data filed at 5/25/2020 0400  Gross per 24 hour   Intake 1010 ml   Output 400 ml   Net 610 ml      Physical Exam   Constitutional: He is oriented to person, place, and time. He appears well-developed and well-nourished.   HENT:   Head: Normocephalic and  atraumatic.   Eyes: Right eye exhibits no discharge. Left eye exhibits no discharge. No scleral icterus.   Cardiovascular: Normal rate, regular rhythm and normal heart sounds.   Pulmonary/Chest: No respiratory distress. He has wheezes. He has rales.   Abdominal: Soft. Bowel sounds are normal. He exhibits no distension.   Musculoskeletal: Normal range of motion. He exhibits edema (1+ pitting LE edema bilaterally to mid legs).   Lymphadenopathy:     He has no cervical adenopathy.   Neurological: He is alert and oriented to person, place, and time.   Skin: Skin is warm and dry.   Psychiatric: He has a normal mood and affect.   Nursing note and vitals reviewed.      Significant Labs:   CBC:   Recent Labs   Lab 05/24/20 0437 05/25/20 0457   WBC 22.02* 21.34*   HGB 7.8* 7.9*   HCT 26.5* 27.1*   * 360*     CMP:   Recent Labs   Lab 05/24/20 0437 05/25/20 0457   * 135*   K 4.3 4.3    100   CO2 25 24   * 116*   BUN 30* 27*   CREATININE 1.8* 1.5*   CALCIUM 9.2 9.3   ANIONGAP 8 11   EGFRNONAA 40* 50*     All pertinent labs within the past 24 hours have been reviewed.    Significant Imaging:    Imaging Results          X-Ray Chest AP Portable (SOB) (Final result)  Result time 05/23/20 18:29:12    Final result by Mason Khan MD (05/23/20 18:29:12)                 Impression:      Interval development of peripheral consolidation involving the left midlung zone compatible with pneumonia.      Electronically signed by: Mason Khan MD  Date:    05/23/2020  Time:    18:29             Narrative:    EXAMINATION:  XR CHEST AP PORTABLE    CLINICAL HISTORY:  Cough.  Acute respiratory failure., SOB;    COMPARISON:  05/18/2020.    FINDINGS:  There has been development of a peripheral left infiltrate.  Peripheral consolidation consistent with pneumonia.  The heart size is upper limit of normal.    Right-sided infiltrate appears improved.  Blunting of the costophrenic angle may represent small  effusions.                                   I have reviewed all pertinent imaging results/findings within the past 24 hours.

## 2020-05-25 NOTE — PROGRESS NOTES
[facility] Hospital Medicine  Progress Note    Patient Name: David Valera  MRN: [id number]  Patient Class: OP- Observation   Admission Date: 5/23/2020  Length of Stay: 0 days  Attending Physician: Bandar John MD  Primary Care Provider: Rodrigo Ames MD        Subjective:     Principal Problem:Acute respiratory failure with hypoxia        HPI:  David Valera is a 60 y.o. AAM with PMH of COPD, CKD, HTN who is presenting as c/o SOB. He states he is a  and drives often long distances. He was recently admitted to the hospital for similar complaints however he had hemoptysis along with SOB. TB and PE were ruled out at that time.treated with levaquin and discharge yesterday but he continue to have shortness of breath . In ed patient was found to have leukocytosis . cxr shows left mid lung consolidation .started on vanco /cefepime respiratiory cultures . Patient was also given dose of lasix 60 mg in ed      Overview/Hospital Course:  5/4/20:  Pt continues to be sob, which he reports started 5 weeks ago and has led to 3 admissions in [facility], where he was r/o for PE and TB and he was last DC'd home on levofloxacin, but continued to have worsening sob, cough and LE edema.       Interval History: Persistent sob and hemoptasis    Review of Systems   Constitutional: Negative for activity change and appetite change.   HENT: Negative for congestion, rhinorrhea and sore throat.    Eyes: Negative for discharge and redness.   Respiratory: Positive for cough (assoc with hemoptasis) and shortness of breath. Negative for chest tightness and wheezing.    Cardiovascular: Positive for leg swelling. Negative for chest pain.   Gastrointestinal: Negative for abdominal pain, diarrhea and vomiting.   Endocrine: Negative for cold intolerance, heat intolerance and polyuria.   Genitourinary: Negative for difficulty urinating, dysuria, flank pain, hematuria and urgency.   Musculoskeletal: Negative  for arthralgias, back pain and myalgias.   Skin: Negative for color change and pallor.   Allergic/Immunologic: Negative for immunocompromised state.   Neurological: Negative for dizziness, light-headedness and headaches.   Hematological: Negative for adenopathy.   Psychiatric/Behavioral: Negative for agitation and confusion. The patient is not nervous/anxious.      Objective:     Vital Signs (Most Recent):  Temp: 98.8 °F (37.1 °C) (05/24/20 1614)  Pulse: 105 (05/24/20 1614)  Resp: 20 (05/24/20 1614)  BP: (!) 94/53 (05/24/20 1614)  SpO2: 97 % (05/24/20 1614) Vital Signs (24h Range):  Temp:  [97 °F (36.1 °C)-98.8 °F (37.1 °C)] 98.8 °F (37.1 °C)  Pulse:  [] 105  Resp:  [16-44] 20  SpO2:  [89 %-100 %] 97 %  BP: ()/(53-72) 94/53     Weight: 77.6 kg (171 lb 1.2 oz)  Body mass index is 30.3 kg/m².    Intake/Output Summary (Last 24 hours) at 5/24/2020 1856  Last data filed at 5/24/2020 1600  Gross per 24 hour   Intake 1026 ml   Output 1000 ml   Net 26 ml      Physical Exam   Constitutional: He is oriented to person, place, and time. He appears well-developed and well-nourished.   HENT:   Head: Normocephalic and atraumatic.   Eyes: Right eye exhibits no discharge. Left eye exhibits no discharge. No scleral icterus.   Cardiovascular: Normal rate, regular rhythm and normal heart sounds.   Pulmonary/Chest: No respiratory distress. He has wheezes. He has rales.   Abdominal: Soft. Bowel sounds are normal. He exhibits no distension.   Musculoskeletal: Normal range of motion.   Lymphadenopathy:     He has no cervical adenopathy.   Neurological: He is alert and oriented to person, place, and time.   Skin: Skin is warm and dry.   Psychiatric: He has a normal mood and affect.   Nursing note and vitals reviewed.      Significant Labs:   CBC:   Recent Labs   Lab 05/23/20  1711 05/24/20  0437   WBC 16.97* 22.02*   HGB 9.1* 7.8*   HCT 31.3* 26.5*   * 361*     CMP:   Recent Labs   Lab 05/23/20  1711 05/24/20  0437   NA  137 135*   K 4.1 4.3    102   CO2 23 25   * 123*   BUN 32* 30*   CREATININE 1.9* 1.8*   CALCIUM 9.8 9.2   PROT 7.7  --    ALBUMIN 3.4*  --    BILITOT 0.5  --    ALKPHOS 65  --    AST 25  --    ALT 26  --    ANIONGAP 11 8   EGFRNONAA 37* 40*     All pertinent labs within the past 24 hours have been reviewed.    Significant Imaging: I have reviewed all pertinent imaging results/findings within the past 24 hours.      Assessment/Plan:      * Acute respiratory failure with hypoxia  -pneumonia started broad spectrum antibotics   - po2 66   -4L on nasal canula oxygen       acute on chronic ckd 3  Avoid nephrotoxic drugs       Acute renal failure superimposed on stage 3 chronic kidney disease  Avoids nephrotoxic drugs   Will continue monitor cr       Pneumonia  -non resolving pneumonia failed levaquin   -started vanc/cefepime   -resp culture blood culture   -procal,nasal mrsa   -cxr shows left sided consolidation  -Will consider pulm consult if not improving in AM    BERTHA on CPAP  - cpap at night       COPD exacerbation  Copd pathway   Steroids   Breathing treatments   - Encouraged to quit smoking completely, which he started at age of 17 years, 1 PPD until age 60 years    Symptomatic anemia  Will consider TFx if Hgb drops <7        VTE Risk Mitigation (From admission, onward)         Ordered     IP VTE HIGH RISK PATIENT  Once      05/23/20 1942     Place sequential compression device  Until discontinued      05/23/20 1942                      Bandar John MD  Department of Hospital Medicine   [facility]

## 2020-05-26 NOTE — HPI
59 y/o with Hx of shortness of breath, cough and chest congestion for 5 months. In the past 5- 6 weeks it has worsened and he reports intermittent blood tingle sputum production.45 pack year history of smoking and stopped a week prior to admission. Denies history of heart disease or chest pain.  He reports PMH of COPD, CKD, HTN

## 2020-05-26 NOTE — ASSESSMENT & PLAN NOTE
-non resolving pneumonia failed levaquin   -started vanc/cefepime on 5/24/20  -resp culture blood culture   -procal,nasal mrsa   -cxr shows left sided consolidation  -Pulm consulted due to severe pulmonary HTN and for any further recs given patient's multiple hosp admissions w/ hypoxia

## 2020-05-26 NOTE — HOSPITAL COURSE
5/25 Started bronchodilators and diuresis. Echo perfomed at outside facility consistent with Mitral Valve disease  5/26 No more hemoptysis. However patient is getting light headed and fallling after periods of coughing, small amount of discolored phlegm  5/27 another episode of nears syncope  5/28 Improved with transfusion  5/29 still weak  05/30: Seen and examined: TRUDY, severe Mitral stenosis, WBCC 18 K, H&H 10.3/33.8. [facility], No further hemoptysis, No bronchoscopy currently indicated.No fever, Admission Procal was Normal, On IV Cefepime that should be deescalated.COPD listed in Diagnosis 25 pack smoker, , No prior spirometry, on Symbicort, suspect cardiac asthma, quantiferon was indeterminate

## 2020-05-26 NOTE — ASSESSMENT & PLAN NOTE
- Pneumonia started broad spectrum Vanc & Cefepime  - po2 66   - 4L on nasal canula oxygen   - May also be partially due to right-sided CHF from severe pulmonary HTN  - F/U pulmonary recs

## 2020-05-26 NOTE — PLAN OF CARE
Upon arrival this AM, notified by night shift that patient had fallen at 0605. Patient had a stat CT this morning that was negative. Patient educated of the importance of calling when he needs assistance getting up for the restroom. Bed alarm on. Patient states that he will not get up on his own. ST on the monitor. 3L O2 NC. Patient experiences severe SOB on minimal exertion; MD aware, IS treatments ordered. Solumedrol and lasix IV. Family updated on plan of care. Will continue to monitor patient.

## 2020-05-26 NOTE — NURSING
Pt stood up at side of bed to urinate and was engaged in an unwitnessed fall. Staff nurse Mar Valera was first to patient's room to assess the situation. Pt was helped back to bed by several staff, including a.m. charge nurses Gus and Elizabeth.  Vital signs were: B/P 128/86, HR 98, 24 Respiration, and SATS 93%.  Pt stated he did not remember falling until he was being assisted back to bed. Dr. Baird was notified of fall; STAT CT of head w/o contrast was ordered. Pt was again cautioned against getting out of bed without standby assist. Bed alarm activated. Vital signs remained stable with no further occurrences.

## 2020-05-26 NOTE — SUBJECTIVE & OBJECTIVE
Past Medical History:   Diagnosis Date    CHF (congestive heart failure)     Emphysema of lung        Past Surgical History:   Procedure Laterality Date    HIP FRACTURE SURGERY         Review of patient's allergies indicates:  No Known Allergies    Family History     None        Tobacco Use    Smoking status: Current Every Day Smoker     Packs/day: 1.00     Years: 25.00     Pack years: 25.00    Smokeless tobacco: Never Used   Substance and Sexual Activity    Alcohol use: Not Currently    Drug use: Not on file    Sexual activity: Not on file         Review of Systems   Constitutional: Positive for diaphoresis and fatigue.   HENT: Positive for congestion, postnasal drip and rhinorrhea.    Respiratory: Positive for cough, chest tightness, shortness of breath, wheezing and stridor.    Cardiovascular: Positive for palpitations and leg swelling.   Gastrointestinal: Negative.    Endocrine: Negative.    Genitourinary: Negative.    Musculoskeletal: Positive for arthralgias.   Skin: Negative.    Allergic/Immunologic: Negative.    Neurological: Positive for weakness.   Hematological: Negative.      Objective:     Vital Signs (Most Recent):  Temp: 97.5 °F (36.4 °C) (05/25/20 1554)  Pulse: 106 (05/25/20 1700)  Resp: 20 (05/25/20 1554)  BP: 101/60 (05/25/20 1554)  SpO2: 95 % (05/25/20 1554) Vital Signs (24h Range):  Temp:  [97.5 °F (36.4 °C)-98.4 °F (36.9 °C)] 97.5 °F (36.4 °C)  Pulse:  [] 106  Resp:  [18-22] 20  SpO2:  [91 %-99 %] 95 %  BP: ()/(55-71) 101/60     Weight: 76.5 kg (168 lb 10.4 oz)  Body mass index is 29.88 kg/m².      Intake/Output Summary (Last 24 hours) at 5/25/2020 1932  Last data filed at 5/25/2020 1300  Gross per 24 hour   Intake 1108 ml   Output 600 ml   Net 508 ml       Physical Exam   Constitutional: He is oriented to person, place, and time. He appears well-developed and well-nourished.   HENT:   Head: Normocephalic and atraumatic.   Mouth/Throat: Oropharyngeal exudate present.   Eyes:  Pupils are equal, round, and reactive to light. Conjunctivae are normal.   Neck: Neck supple. No JVD present. No tracheal deviation present. No thyromegaly present.   Cardiovascular: Normal rate, regular rhythm and normal heart sounds.   No murmur heard.  Pulmonary/Chest: Effort normal. He has decreased breath sounds. He has wheezes in the right lower field and the left lower field. He has no rhonchi. He has no rales.   Abdominal: Soft. Bowel sounds are normal.   Musculoskeletal: Normal range of motion. He exhibits no edema or tenderness.   Lymphadenopathy:     He has no cervical adenopathy.   Neurological: He is alert and oriented to person, place, and time.   Skin: Skin is warm and dry.   Nursing note and vitals reviewed.      Vents:  Oxygen Concentration (%): 32 (05/25/20 1349)    Lines/Drains/Airways     Peripheral Intravenous Line                 Peripheral IV - Single Lumen 05/23/20 1711 20 G Left Wrist 2 days                Significant Labs:    CBC/Anemia Profile:  Recent Labs   Lab 05/24/20 0437 05/25/20 0457   WBC 22.02* 21.34*   HGB 7.8* 7.9*   HCT 26.5* 27.1*   * 360*   MCV 76* 76*   RDW 18.8* 18.4*        Chemistries:  Recent Labs   Lab 05/24/20 0437 05/25/20 0457   * 135*   K 4.3 4.3    100   CO2 25 24   BUN 30* 27*   CREATININE 1.8* 1.5*   CALCIUM 9.2 9.3   MG 2.0 2.3   PHOS 4.4 4.3       ABGs:   Recent Labs   Lab 05/23/20  2048   PH 7.471*   PCO2 30.4*   HCO3 22.2*   POCSATURATED 94*   BE -1     BMP:   Recent Labs   Lab 05/25/20 0457   *   *   K 4.3      CO2 24   BUN 27*   CREATININE 1.5*   CALCIUM 9.3   MG 2.3     CMP:   Recent Labs   Lab 05/24/20 0437 05/25/20 0457   * 135*   K 4.3 4.3    100   CO2 25 24   * 116*   BUN 30* 27*   CREATININE 1.8* 1.5*   CALCIUM 9.2 9.3   ANIONGAP 8 11   EGFRNONAA 40* 50*     All pertinent labs within the past 24 hours have been reviewed.    Significant Imaging:   I have reviewed all pertinent imaging  results/findings within the past 24 hours.  I have reviewed and interpreted all pertinent imaging results/findings within the past 24 hours.     X-Ray Chest AP Portable (SOB)  Narrative: EXAMINATION:  XR CHEST AP PORTABLE    CLINICAL HISTORY:  Cough.  Acute respiratory failure., SOB;    COMPARISON:  05/18/2020.    FINDINGS:  There has been development of a peripheral left infiltrate.  Peripheral consolidation consistent with pneumonia.  The heart size is upper limit of normal.    Right-sided infiltrate appears improved.  Blunting of the costophrenic angle may represent small effusions.  Impression: Interval development of peripheral consolidation involving the left midlung zone compatible with pneumonia.    Electronically signed by: Mason Khan MD  Date:    05/23/2020  Time:    18:29

## 2020-05-26 NOTE — PROGRESS NOTES
[facility]   Pulmonology  Progress Note    Patient Name: David Valera  MRN: [id number]  Admission Date: 5/23/2020  Hospital Length of Stay: 0 days  Code Status: Full Code  Attending Provider: Bandar John MD  Primary Care Provider: Rodrigo Ames MD   Principal Problem: Acute respiratory failure with hypoxia    Subjective: cough and shortness of breath      Past Medical History:   Diagnosis Date    CHF (congestive heart failure)     Emphysema of lung        Past Surgical History:   Procedure Laterality Date    HIP FRACTURE SURGERY         Review of patient's allergies indicates:  No Known Allergies    Family History     None        Tobacco Use    Smoking status: Current Every Day Smoker     Packs/day: 1.00     Years: 25.00     Pack years: 25.00    Smokeless tobacco: Never Used   Substance and Sexual Activity    Alcohol use: Not Currently    Drug use: Not on file    Sexual activity: Not on file         Review of Systems   Constitutional: Positive for diaphoresis and fatigue.   HENT: Positive for congestion, postnasal drip and rhinorrhea.    Respiratory: Positive for cough, chest tightness, shortness of breath, wheezing and stridor.    Cardiovascular: Positive for palpitations and leg swelling.   Gastrointestinal: Negative.    Endocrine: Negative.    Genitourinary: Negative.    Musculoskeletal: Positive for arthralgias.   Skin: Negative.    Allergic/Immunologic: Negative.    Neurological: Positive for weakness.   Hematological: Negative.      Objective:     Vital Signs (Most Recent):  Temp: 97.5 °F (36.4 °C) (05/25/20 1554)  Pulse: 106 (05/25/20 1700)  Resp: 20 (05/25/20 1554)  BP: 101/60 (05/25/20 1554)  SpO2: 95 % (05/25/20 1554) Vital Signs (24h Range):  Temp:  [97.5 °F (36.4 °C)-98.4 °F (36.9 °C)] 97.5 °F (36.4 °C)  Pulse:  [] 106  Resp:  [18-22] 20  SpO2:  [91 %-99 %] 95 %  BP: ()/(55-71) 101/60     Weight: 76.5 kg (168 lb 10.4 oz)  Body mass index is 29.88  kg/m².      Intake/Output Summary (Last 24 hours) at 5/25/2020 1932  Last data filed at 5/25/2020 1300  Gross per 24 hour   Intake 1108 ml   Output 600 ml   Net 508 ml       Physical Exam   Constitutional: He is oriented to person, place, and time. He appears well-developed and well-nourished.   HENT:   Head: Normocephalic and atraumatic.   Mouth/Throat: Oropharyngeal exudate present.   Eyes: Pupils are equal, round, and reactive to light. Conjunctivae are normal.   Neck: Neck supple. No JVD present. No tracheal deviation present. No thyromegaly present.   Cardiovascular: Normal rate, regular rhythm and normal heart sounds.   No murmur heard.  Pulmonary/Chest: Effort normal. He has decreased breath sounds. He has wheezes in the right lower field and the left lower field. He has no rhonchi. He has no rales.   Abdominal: Soft. Bowel sounds are normal.   Musculoskeletal: Normal range of motion. He exhibits no edema or tenderness.   Lymphadenopathy:     He has no cervical adenopathy.   Neurological: He is alert and oriented to person, place, and time.   Skin: Skin is warm and dry.   Nursing note and vitals reviewed.      Vents:  Oxygen Concentration (%): 32 (05/25/20 1349)    Lines/Drains/Airways     Peripheral Intravenous Line                 Peripheral IV - Single Lumen 05/23/20 1711 20 G Left Wrist 2 days                Significant Labs:    CBC/Anemia Profile:  Recent Labs   Lab 05/24/20 0437 05/25/20  0457   WBC 22.02* 21.34*   HGB 7.8* 7.9*   HCT 26.5* 27.1*   * 360*   MCV 76* 76*   RDW 18.8* 18.4*        Chemistries:  Recent Labs   Lab 05/24/20  0437 05/25/20  0457   * 135*   K 4.3 4.3    100   CO2 25 24   BUN 30* 27*   CREATININE 1.8* 1.5*   CALCIUM 9.2 9.3   MG 2.0 2.3   PHOS 4.4 4.3       ABGs:   Recent Labs   Lab 05/23/20 2048   PH 7.471*   PCO2 30.4*   HCO3 22.2*   POCSATURATED 94*   BE -1     BMP:   Recent Labs   Lab 05/25/20  0457   *   *   K 4.3      CO2 24   BUN 27*    CREATININE 1.5*   CALCIUM 9.3   MG 2.3     CMP:   Recent Labs   Lab 05/24/20  0437 05/25/20  0457   * 135*   K 4.3 4.3    100   CO2 25 24   * 116*   BUN 30* 27*   CREATININE 1.8* 1.5*   CALCIUM 9.2 9.3   ANIONGAP 8 11   EGFRNONAA 40* 50*     All pertinent labs within the past 24 hours have been reviewed.    Significant Imaging:   I have reviewed all pertinent imaging results/findings within the past 24 hours.  I have reviewed and interpreted all pertinent imaging results/findings within the past 24 hours.     X-Ray Chest AP Portable (SOB)  Narrative: EXAMINATION:  XR CHEST AP PORTABLE    CLINICAL HISTORY:  Cough.  Acute respiratory failure., SOB;    COMPARISON:  05/18/2020.    FINDINGS:  There has been development of a peripheral left infiltrate.  Peripheral consolidation consistent with pneumonia.  The heart size is upper limit of normal.    Right-sided infiltrate appears improved.  Blunting of the costophrenic angle may represent small effusions.  Impression: Interval development of peripheral consolidation involving the left midlung zone compatible with pneumonia.    Electronically signed by: Mason Khan MD  Date:    05/23/2020  Time:    18:29          ABG  Recent Labs   Lab 05/23/20 2048   PH 7.471*   PO2 65*   PCO2 30.4*   HCO3 22.2*   BE -1     Assessment/Plan:     * Acute respiratory failure with hypoxia  5/25 bronchodilators, steroids, jet nebs    Nonrheumatic mitral valve stenosis with insufficiency  Echocardiogram  - consider transesophageal    Nonrheumatic mitral valve stenosis with insufficiency  5/25 repeat echocardiogram    Acute on chronic congestive heart failure  5/25 /2020  - BBlocker, diuretics    Hemoptysis  5/25 Hold ASA and anticoagulants  Bronchoscopy prior to discharge           Juice Rock MD  Pulmonology  [facility]

## 2020-05-26 NOTE — PROGRESS NOTES
[facility] Hospital Medicine  Progress Note    Patient Name: David Valera  MRN: [id number]  Patient Class: OP- Observation   Admission Date: 5/23/2020  Length of Stay: 0 days  Attending Physician: Bandar John MD  Primary Care Provider: Rodrigo Ames MD        Subjective:     Principal Problem:Acute respiratory failure with hypoxia        HPI:  David Valera is a 60 y.o. AAM with PMH of COPD, CKD, HTN who is presenting as c/o SOB. He states he is a  and drives often long distances. He was recently admitted to the hospital for similar complaints however he had hemoptysis along with SOB. TB and PE were ruled out at that time.treated with levaquin and discharge yesterday but he continue to have shortness of breath . In ed patient was found to have leukocytosis . cxr shows left mid lung consolidation .started on vanco /cefepime respiratiory cultures . Patient was also given dose of lasix 60 mg in ed      Overview/Hospital Course:  5/24/20:  Pt continues to be sob, which he reports started 5 weeks ago and has led to 3 admissions in [facility], where he was r/o for PE and TB and he was last DC'd home on levofloxacin, but continued to have worsening sob, cough and LE edema.   - Encouraged to quit smoking completely and to avoid all foods or drinks with >190 mg sodium/serving.    5/25/20:  Pt continue to have sob, particularly with minimal ambulation. Discussed healthy lifestyle modifications in detail with pt and his niece, who is a nurse. Will restart gentle diuresis given pitting LE edema and elevated BNP to 760  - Pulm consulted given his severe pulmonary HTN (and severe LAE & BLAYNE) RE possible sildenafil and/or other suggestions   - Continue Vanc & Cefepime for now  - Continue to encourage to avoid all foods or drinks with >190 mg sodium/serving and to with >9 carbohydrate / fiber ratio.      Interval History: Patient with clinical deterioration (tachycardia) overnight,  which was thought to be 2/2 ambulation, which resolved with rest    Review of Systems   Constitutional: Negative for activity change and appetite change.   HENT: Negative for congestion, rhinorrhea and sore throat.    Eyes: Negative for discharge and redness.   Respiratory: Positive for cough (assoc with hemoptasis) and shortness of breath. Negative for chest tightness and wheezing.    Cardiovascular: Positive for leg swelling. Negative for chest pain.   Gastrointestinal: Negative for abdominal pain, diarrhea and vomiting.   Endocrine: Negative for cold intolerance, heat intolerance and polyuria.   Genitourinary: Negative for difficulty urinating, dysuria, flank pain, hematuria and urgency.   Musculoskeletal: Negative for arthralgias, back pain and myalgias.   Skin: Negative for color change and pallor.   Allergic/Immunologic: Negative for immunocompromised state.   Neurological: Negative for dizziness, light-headedness and headaches.   Hematological: Negative for adenopathy.   Psychiatric/Behavioral: Negative for agitation and confusion. The patient is not nervous/anxious.      Objective:     Vital Signs (Most Recent):  Temp: 97.9 °F (36.6 °C) (05/25/20 0738)  Pulse: 107 (05/25/20 0900)  Resp: 18 (05/25/20 0820)  BP: (!) 97/59 (05/25/20 0738)  SpO2: (!) 94 % (05/25/20 0820) Vital Signs (24h Range):  Temp:  [97.7 °F (36.5 °C)-98.8 °F (37.1 °C)] 97.9 °F (36.6 °C)  Pulse:  [] 107  Resp:  [18-44] 18  SpO2:  [91 %-100 %] 94 %  BP: ()/(53-71) 97/59     Weight: 76.5 kg (168 lb 10.4 oz)  Body mass index is 29.88 kg/m².    Intake/Output Summary (Last 24 hours) at 5/25/2020 1022  Last data filed at 5/25/2020 0400  Gross per 24 hour   Intake 1010 ml   Output 400 ml   Net 610 ml      Physical Exam   Constitutional: He is oriented to person, place, and time. He appears well-developed and well-nourished.   HENT:   Head: Normocephalic and atraumatic.   Eyes: Right eye exhibits no discharge. Left eye exhibits no  discharge. No scleral icterus.   Cardiovascular: Normal rate, regular rhythm and normal heart sounds.   Pulmonary/Chest: No respiratory distress. He has wheezes. He has rales.   Abdominal: Soft. Bowel sounds are normal. He exhibits no distension.   Musculoskeletal: Normal range of motion. He exhibits edema (1+ pitting LE edema bilaterally to mid legs).   Lymphadenopathy:     He has no cervical adenopathy.   Neurological: He is alert and oriented to person, place, and time.   Skin: Skin is warm and dry.   Psychiatric: He has a normal mood and affect.   Nursing note and vitals reviewed.      Significant Labs:   CBC:   Recent Labs   Lab 05/24/20 0437 05/25/20 0457   WBC 22.02* 21.34*   HGB 7.8* 7.9*   HCT 26.5* 27.1*   * 360*     CMP:   Recent Labs   Lab 05/24/20 0437 05/25/20 0457   * 135*   K 4.3 4.3    100   CO2 25 24   * 116*   BUN 30* 27*   CREATININE 1.8* 1.5*   CALCIUM 9.2 9.3   ANIONGAP 8 11   EGFRNONAA 40* 50*     All pertinent labs within the past 24 hours have been reviewed.    Significant Imaging:    Imaging Results          X-Ray Chest AP Portable (SOB) (Final result)  Result time 05/23/20 18:29:12    Final result by Mason Khan MD (05/23/20 18:29:12)                 Impression:      Interval development of peripheral consolidation involving the left midlung zone compatible with pneumonia.      Electronically signed by: Mason Khan MD  Date:    05/23/2020  Time:    18:29             Narrative:    EXAMINATION:  XR CHEST AP PORTABLE    CLINICAL HISTORY:  Cough.  Acute respiratory failure., SOB;    COMPARISON:  05/18/2020.    FINDINGS:  There has been development of a peripheral left infiltrate.  Peripheral consolidation consistent with pneumonia.  The heart size is upper limit of normal.    Right-sided infiltrate appears improved.  Blunting of the costophrenic angle may represent small effusions.                                   I have reviewed all pertinent  imaging results/findings within the past 24 hours.      Assessment/Plan:      * Acute respiratory failure with hypoxia  - Pneumonia started broad spectrum Vanc & Cefepime  - po2 66   - 4L on nasal canula oxygen   - May also be partially due to right-sided CHF from severe pulmonary HTN  - F/U pulmonary recs      acute on chronic ckd 3  Avoid nephrotoxic drugs       Acute renal failure superimposed on stage 3 chronic kidney disease  Avoids nephrotoxic drugs   Will continue monitor cr       Pneumonia  -non resolving pneumonia failed levaquin   -started vanc/cefepime on 5/24/20  -resp culture blood culture   -procal,nasal mrsa   -cxr shows left sided consolidation  -Pulm consulted due to severe pulmonary HTN and for any further recs given patient's multiple hosp admissions w/ hypoxia    BERTHA on CPAP  - cpap at night       COPD exacerbation  Copd pathway   Steroids   Breathing treatments   - Encouraged to quit smoking completely, which he started at age of 17 years, 1 PPD until age 60 years    Pulmonary hypertension  Will consider sildenafil      Symptomatic anemia  Will consider TFx if Hgb drops <7        VTE Risk Mitigation (From admission, onward)         Ordered     IP VTE HIGH RISK PATIENT  Once      05/23/20 1942     Place sequential compression device  Until discontinued      05/23/20 1942                      Bandar John MD  Department of Hospital Medicine   [facility]

## 2020-05-26 NOTE — PROGRESS NOTES
Contacted by RN stating that patient is in respiratory distress and needed to be assessed. Upon arrival, patient sitting on edge of bed and very anxious. RR 24 and SpO2 96% on 3LNC. RN notified patient had no PRN treatments ordered. Helped patient lay in bed and coached him on proper deep breathing (Spo2 increased to 98% at this time). Once PRN treatments were ordered, patient did receive a treatment and tolerated well. No distress noted currently- will continue to monitor.

## 2020-05-27 PROBLEM — R55 COUGH SYNCOPE SYNDROME: Status: ACTIVE | Noted: 2020-01-01

## 2020-05-27 PROBLEM — R05.4 COUGH SYNCOPE SYNDROME: Status: ACTIVE | Noted: 2020-01-01

## 2020-05-27 PROBLEM — I34.2 NONRHEUMATIC MITRAL VALVE STENOSIS: Status: ACTIVE | Noted: 2020-01-01

## 2020-05-27 NOTE — TELEPHONE ENCOUNTER
----- Message from Dulce Reese sent at 5/27/2020 11:55 AM CDT -----  Fariba requesting a call back regarding pt being hosp. Please call back at  [phone]

## 2020-05-27 NOTE — NURSING
1941: Nurse (EDWARD Garcia, RN) was notified by the [facility] that the patient had fallen and a nurse Marian was currently in the room and assessing the patient. Patient was next to the bed sitting upright on the floor. The patient was engaged in a unwitnessed fall without injury. Patient was assisted back into the bed by charge nurse Jean Gonzalez and sereval other nurses to assist. Patient was assessed and vital sign were taken: Temp 98, Pulse 136, R. 25, 02 97%, B/P 120/65. Patient is also alert and oriented. It is suspected that the patient is cutting the bed alarm off himself because he feels and has stated he can move on his own.    Prior to fall during initial assessment with assigned nurse @1913 the bed alarm was set and patient was assisted to the restroom. Patient was educated on not getting up and calling for help. Patient's call light was placed in reach, side rails x2 and door open.

## 2020-05-27 NOTE — TELEPHONE ENCOUNTER
----- Message from Casandra Duke sent at 5/27/2020  2:43 PM CDT -----  Contact: [facility]  Type:  Patient Returning Call    Who Called: Joe  Who Left Message for Patient: staff  Does the patient know what this is regarding?:na/  Would the patient rather a call back or a response via [facility]? Call back  Best Call Back Number:[phone]  Additional Information: please call back Thanks.

## 2020-05-27 NOTE — PROGRESS NOTES
[facility]   Pulmonology  Progress Note    Patient Name: David Valera  MRN: [id number]  Admission Date: 5/23/2020  Hospital Length of Stay: 1 days  Code Status: Full Code  Attending Provider: Bandar John MD  Primary Care Provider: Rodrigo Ames MD   Principal Problem: Acute respiratory failure with hypoxia    Subjective: slow improvement but still coughing      Interval History: episodes of cough syncope    Objective:     Vital Signs (Most Recent):  Temp: 97.8 °F (36.6 °C) (05/27/20 1600)  Pulse: 95 (05/27/20 1600)  Resp: 16 (05/27/20 1600)  BP: 108/75 (05/27/20 1600)  SpO2: 97 % (05/27/20 1600) Vital Signs (24h Range):  Temp:  [97.7 °F (36.5 °C)-98.4 °F (36.9 °C)] 97.8 °F (36.6 °C)  Pulse:  [] 95  Resp:  [16-30] 16  SpO2:  [96 %-100 %] 97 %  BP: ()/(58-75) 108/75     Weight: 76.7 kg (169 lb)  Body mass index is 29.94 kg/m².      Intake/Output Summary (Last 24 hours) at 5/27/2020 1703  Last data filed at 5/27/2020 1100  Gross per 24 hour   Intake --   Output 125 ml   Net -125 ml       Physical Exam   Constitutional: He is oriented to person, place, and time. He appears toxic. He has a sickly appearance. He appears ill. He appears distressed.   HENT:   Head: Normocephalic.   Eyes: Pupils are equal, round, and reactive to light.   Neck: Neck supple.   Cardiovascular:   Murmur heard.  Pulmonary/Chest: He has rales.   Abdominal: Soft.   Musculoskeletal: He exhibits edema.   Neurological: He is alert and oriented to person, place, and time.   Skin: Skin is warm and dry.   Nursing note and vitals reviewed.      Vents:  Oxygen Concentration (%): 3 (05/27/20 0423)    Lines/Drains/Airways     Peripheral Intravenous Line                 Peripheral IV - Single Lumen 05/23/20 1711 20 G Left Wrist 3 days                Significant Labs:    CBC/Anemia Profile:  Recent Labs   Lab 05/26/20  0656 05/27/20  0532   WBC 16.49* 15.05*   HGB 7.7* 7.0*   HCT 26.0* 23.7*   * 368*   MCV 75* 73*    RDW 18.4* 18.6*        Chemistries:  Recent Labs   Lab 05/26/20  0738 05/27/20  0532   * 132*   K 4.4 4.3   CL 97 98   CO2 22* 22*   BUN 35* 46*   CREATININE 1.6* 1.8*   CALCIUM 9.4 9.3   MG 2.2 2.3   PHOS 5.0* 4.7*       ABGs: No results for input(s): PH, PCO2, HCO3, POCSATURATED, BE in the last 48 hours.  Bilirubin:   Recent Labs   Lab 05/05/20  0446 05/18/20  1427 05/20/20  0501 05/21/20  0448 05/23/20  1711   BILITOT 0.4 0.9 0.7 0.5 0.5     Blood Culture: No results for input(s): LABBLOO in the last 48 hours.  BMP:   Recent Labs   Lab 05/27/20  0532   *   *   K 4.3   CL 98   CO2 22*   BUN 46*   CREATININE 1.8*   CALCIUM 9.3   MG 2.3     All pertinent labs within the past 24 hours have been reviewed.    Significant Imaging:  Past Medical History:   Diagnosis Date    CHF (congestive heart failure)     Emphysema of lung        Past Surgical History:   Procedure Laterality Date    HIP FRACTURE SURGERY         Review of patient's allergies indicates:  No Known Allergies    Family History     None        Tobacco Use    Smoking status: Current Every Day Smoker     Packs/day: 1.00     Years: 25.00     Pack years: 25.00    Smokeless tobacco: Never Used   Substance and Sexual Activity    Alcohol use: Not Currently    Drug use: Not on file    Sexual activity: Not on file         Review of Systems   Constitutional: Positive for diaphoresis and fatigue.   HENT: Positive for congestion, postnasal drip and rhinorrhea.    Respiratory: Positive for cough, chest tightness, shortness of breath, wheezing and stridor.    Cardiovascular: Positive for palpitations and leg swelling.   Gastrointestinal: Negative.    Endocrine: Negative.    Genitourinary: Negative.    Musculoskeletal: Positive for arthralgias.   Skin: Negative.    Allergic/Immunologic: Negative.    Neurological: Positive for weakness.   Hematological: Negative.      Objective:     Vital Signs (Most Recent):  Temp: 97.8 °F (36.6 °C) (05/27/20  1600)  Pulse: 95 (05/27/20 1600)  Resp: 16 (05/27/20 1600)  BP: 108/75 (05/27/20 1600)  SpO2: 97 % (05/27/20 1600) Vital Signs (24h Range):  Temp:  [97.7 °F (36.5 °C)-98.4 °F (36.9 °C)] 97.8 °F (36.6 °C)  Pulse:  [] 95  Resp:  [16-30] 16  SpO2:  [96 %-100 %] 97 %  BP: ()/(58-75) 108/75     Weight: 76.7 kg (169 lb)  Body mass index is 29.94 kg/m².      Intake/Output Summary (Last 24 hours) at 5/27/2020 1703  Last data filed at 5/27/2020 1100  Gross per 24 hour   Intake --   Output 125 ml   Net -125 ml       Physical Exam   Constitutional: He is oriented to person, place, and time. He appears well-developed and well-nourished.   HENT:   Head: Normocephalic and atraumatic.   Mouth/Throat: Oropharyngeal exudate present.   Eyes: Pupils are equal, round, and reactive to light. Conjunctivae are normal.   Neck: Neck supple. No JVD present. No tracheal deviation present. No thyromegaly present.   Cardiovascular: Normal rate, regular rhythm and normal heart sounds.   No murmur heard.  Pulmonary/Chest: Effort normal. He has decreased breath sounds. He has wheezes in the right lower field and the left lower field. He has no rhonchi. He has no rales.   Abdominal: Soft. Bowel sounds are normal.   Musculoskeletal: Normal range of motion. He exhibits no edema or tenderness.   Lymphadenopathy:     He has no cervical adenopathy.   Neurological: He is alert and oriented to person, place, and time.   Skin: Skin is warm and dry.   Nursing note and vitals reviewed.      Vents:  Oxygen Concentration (%): 3 (05/27/20 0423)    Lines/Drains/Airways     Peripheral Intravenous Line                 Peripheral IV - Single Lumen 05/23/20 1711 20 G Left Wrist 3 days                Significant Labs:    CBC/Anemia Profile:  Recent Labs   Lab 05/26/20  0656 05/27/20  0532   WBC 16.49* 15.05*   HGB 7.7* 7.0*   HCT 26.0* 23.7*   * 368*   MCV 75* 73*   RDW 18.4* 18.6*        Chemistries:  Recent Labs   Lab 05/26/20  0738  05/27/20  0532   * 132*   K 4.4 4.3   CL 97 98   CO2 22* 22*   BUN 35* 46*   CREATININE 1.6* 1.8*   CALCIUM 9.4 9.3   MG 2.2 2.3   PHOS 5.0* 4.7*       ABGs:   No results for input(s): PH, PCO2, HCO3, POCSATURATED, BE in the last 48 hours.  BMP:   Recent Labs   Lab 05/27/20  0532   *   *   K 4.3   CL 98   CO2 22*   BUN 46*   CREATININE 1.8*   CALCIUM 9.3   MG 2.3     CMP:   Recent Labs   Lab 05/26/20  0738 05/27/20  0532   * 132*   K 4.4 4.3   CL 97 98   CO2 22* 22*   * 158*   BUN 35* 46*   CREATININE 1.6* 1.8*   CALCIUM 9.4 9.3   ANIONGAP 13 12   EGFRNONAA 46* 40*     All pertinent labs within the past 24 hours have been reviewed.    Significant Imaging:   I have reviewed all pertinent imaging results/findings within the past 24 hours.  I have reviewed and interpreted all pertinent imaging results/findings within the past 24 hours.     X-Ray Chest AP Portable  Narrative: EXAMINATION:  XR CHEST AP PORTABLE    CLINICAL HISTORY:  shortness of breath;    TECHNIQUE:  Single frontal view of the chest was performed.    COMPARISON:  05/23/2020    FINDINGS:  Patchy infiltrates are seen throughout the mid and lower lung zones which is slightly progressed from prior exam.  Mild cardiomegaly.  Aorta demonstrates atherosclerotic disease.  No pleural effusion or pneumothorax.  Bones demonstrate mild degenerative changes in comparison to the prior study, there is no adverse interval changes.  Impression: Patchy infiltrates are seen throughout the mid and lower lung zones which is slightly progressed from prior exam.    Electronically signed by: Rordigo Maddox MD  Date:    05/27/2020  Time:    13:38  I have reviewed all pertinent imaging results/findings within the past 24 hours.  I have reviewed and interpreted all pertinent imaging results/findings within the past 24 hours.      ABG  Recent Labs   Lab 05/23/20 2048   PH 7.471*   PO2 65*   PCO2 30.4*   HCO3 22.2*   BE -1     Assessment/Plan:      Cough syncope syndrome  Related to reduced cardiac output and episodes of coughing with reduced preload - start cough medication    Pneumonia  5/26 continue antibiotic  5/27 Infiltrates slightly worse - suspect due to Congestive Heart failure  Patient does not appear to be toxic. WBC count may be due to steroids - will reduce IV solumedrol    Hemoptysis  5/25 Hold ASA and anticoagulants  Bronchoscopy prior to discharge  5/26 resolved  5/27 discussed with Dr. Pike, primary problem is mitral stenosis and associated hemoptysis. The primary issue to treat is the Congestive Heart failure and mitral stenosis. After this is stabilized will re-evaluate for bronchoscopy.    Pulmonary hypertension  Sildenafil not clearly indicated  5/27 Pulmonary Hypertension due to Congestive Heart failure from Mitral Stenosis           Juice Rock MD  Pulmonology  [facility]

## 2020-05-27 NOTE — PROGRESS NOTES
[facility] Hospital Medicine  Progress Note    Patient Name: David Valera  MRN: [id number]  Patient Class: IP- Inpatient   Admission Date: 5/23/2020  Length of Stay: 1 days  Attending Physician: Bandar John MD  Primary Care Provider: Rodrigo Ames MD        Subjective:     Principal Problem:Acute respiratory failure with hypoxia        HPI:  David Valera is a 60 y.o. AAM with PMH of COPD, CKD, HTN who is presenting as c/o SOB. He states he is a  and drives often long distances. He was recently admitted to the hospital for similar complaints however he had hemoptysis along with SOB. TB and PE were ruled out at that time.treated with levaquin and discharge yesterday but he continue to have shortness of breath . In ed patient was found to have leukocytosis . cxr shows left mid lung consolidation .started on vanco /cefepime respiratiory cultures . Patient was also given dose of lasix 60 mg in ed      Overview/Hospital Course:  5/24/20:  Pt continues to be sob, which he reports started 5 weeks ago and has led to 3 admissions in [facility], where he was r/o for PE and TB and he was last DC'd home on levofloxacin, but continued to have worsening sob, cough and LE edema.   - Encouraged to quit smoking completely and to avoid all foods or drinks with >190 mg sodium/serving.    5/25/20:  Pt continue to have sob, particularly with minimal ambulation. Discussed healthy lifestyle modifications in detail with pt and his niece, who is a nurse. Will restart gentle diuresis given pitting LE edema and elevated BNP to 760  - Pulm consulted given his severe pulmonary HTN (and severe LAE & BLAYNE) RE possible sildenafil and/or other suggestions   - Continue Vanc & Cefepime for now  - Continue to encourage to avoid all foods or drinks with >190 mg sodium/serving and to with >9 carbohydrate / fiber ratio.    5/26/20:  Patient fell last night while up to restroom and hit his head; Head CT  unremarkable; no residual pain or deficits since. No further hemoptasis.   - F/U bronchoscopy results once complete  - Consider sildenafil as outpatient  - Continue Vanc/Cefepime    Interval History: Fell while up to restroom and hit head; head CT WNL; no residual deficits or pain; resolution of hemoptasis    Review of Systems   Constitutional: Negative for activity change and appetite change.   HENT: Negative for congestion, rhinorrhea and sore throat.    Eyes: Negative for discharge and redness.   Respiratory: Positive for cough (assoc with hemoptasis) and shortness of breath. Negative for chest tightness and wheezing.    Cardiovascular: Negative for chest pain and leg swelling.   Gastrointestinal: Negative for abdominal pain, diarrhea and vomiting.   Endocrine: Negative for cold intolerance, heat intolerance and polyuria.   Genitourinary: Negative for difficulty urinating, dysuria, flank pain, hematuria and urgency.   Musculoskeletal: Negative for arthralgias, back pain and myalgias.   Skin: Negative for color change and pallor.   Allergic/Immunologic: Negative for immunocompromised state.   Neurological: Negative for dizziness, light-headedness and headaches.   Hematological: Negative for adenopathy.   Psychiatric/Behavioral: Negative for agitation and confusion. The patient is not nervous/anxious.      Objective:     Vital Signs (Most Recent):  Temp: 97.7 °F (36.5 °C) (05/26/20 2338)  Pulse: 110 (05/26/20 2338)  Resp: 18 (05/26/20 2338)  BP: 96/66 (05/26/20 2338)  SpO2: 99 % (05/26/20 2338) Vital Signs (24h Range):  Temp:  [97.4 °F (36.3 °C)-98.4 °F (36.9 °C)] 97.7 °F (36.5 °C)  Pulse:  [100-136] 110  Resp:  [15-25] 18  SpO2:  [97 %-100 %] 99 %  BP: ()/(59-67) 96/66     Weight: 76.7 kg (169 lb)  Body mass index is 29.94 kg/m².    Intake/Output Summary (Last 24 hours) at 5/27/2020 0053  Last data filed at 5/26/2020 1300  Gross per 24 hour   Intake 1253 ml   Output 1125 ml   Net 128 ml      Physical Exam    Constitutional: He is oriented to person, place, and time. He appears well-developed and well-nourished.   HENT:   Head: Normocephalic and atraumatic.   Eyes: Pupils are equal, round, and reactive to light. Conjunctivae are normal. Right eye exhibits no discharge. Left eye exhibits no discharge. No scleral icterus.   Neck: Neck supple. No JVD present. No tracheal deviation present. No thyromegaly present.   Cardiovascular: Normal rate, regular rhythm and normal heart sounds.   No murmur heard.  Pulmonary/Chest: Effort normal. No respiratory distress. He has decreased breath sounds. He has wheezes in the right lower field and the left lower field. He has no rhonchi. He has no rales.   Abdominal: Soft. Bowel sounds are normal. He exhibits no distension.   Musculoskeletal: Normal range of motion. He exhibits no edema or tenderness.   Lymphadenopathy:     He has no cervical adenopathy.   Neurological: He is alert and oriented to person, place, and time.   Skin: Skin is warm and dry.   Psychiatric: He has a normal mood and affect.   Nursing note and vitals reviewed.      Significant Labs:   CBC:   Recent Labs   Lab 05/25/20 0457 05/26/20  0656   WBC 21.34* 16.49*   HGB 7.9* 7.7*   HCT 27.1* 26.0*   * 367*     CMP:   Recent Labs   Lab 05/25/20 0457 05/26/20  0738   * 132*   K 4.3 4.4    97   CO2 24 22*   * 166*   BUN 27* 35*   CREATININE 1.5* 1.6*   CALCIUM 9.3 9.4   ANIONGAP 11 13   EGFRNONAA 50* 46*     All pertinent labs within the past 24 hours have been reviewed.    Significant Imaging:    Imaging Results          X-Ray Chest AP Portable (SOB) (Final result)  Result time 05/23/20 18:29:12    Final result by Mason Khan MD (05/23/20 18:29:12)                 Impression:      Interval development of peripheral consolidation involving the left midlung zone compatible with pneumonia.      Electronically signed by: Mason Khan MD  Date:    05/23/2020  Time:    18:29              Narrative:    EXAMINATION:  XR CHEST AP PORTABLE    CLINICAL HISTORY:  Cough.  Acute respiratory failure., SOB;    COMPARISON:  05/18/2020.    FINDINGS:  There has been development of a peripheral left infiltrate.  Peripheral consolidation consistent with pneumonia.  The heart size is upper limit of normal.    Right-sided infiltrate appears improved.  Blunting of the costophrenic angle may represent small effusions.                                   I have reviewed all pertinent imaging results/findings within the past 24 hours.      Assessment/Plan:      * Acute respiratory failure with hypoxia  - Pneumonia started broad spectrum Vanc & Cefepime  - po2 66   - 4L on nasal canula oxygen   - May also be partially due to right-sided CHF from severe pulmonary HTN  - F/U pulmonary recs      acute on chronic ckd 3  Avoid nephrotoxic drugs       Acute renal failure superimposed on stage 3 chronic kidney disease  Avoids nephrotoxic drugs   Will continue monitor cr       Pneumonia  -non resolving pneumonia failed levaquin   -started vanc/cefepime on 5/24/20  -resp culture blood culture   -procal,nasal mrsa   -cxr shows left sided consolidation  -Pulm consulted due to severe pulmonary HTN and for any further recs given patient's multiple hosp admissions w/ hypoxia, who plans to check bronchoscopy prior to DC    Acute on chronic congestive heart failure  Right sided 2/2 COPD (pulmonary HTN) 2/2 smoking and BERTHA; LE edema improved following Lasix 20 mg yesterday, but this may have contributed to his fall given his low BPs in the 90's/60's and pre-load dependant physiology.  - Consider sildenafil  - Hold furosemide        BERTHA on CPAP  - cpap at night       COPD exacerbation  Copd pathway   Steroids   Breathing treatments   - Encouraging to quit smoking completely, which he started at age of 17 years, 1 PPD until age 60 years    Pulmonary hypertension  Will consider sildenafil      Symptomatic anemia  Will consider TFx if Hgb  drops <7        VTE Risk Mitigation (From admission, onward)         Ordered     IP VTE HIGH RISK PATIENT  Once      05/23/20 1942     Place sequential compression device  Until discontinued      05/23/20 1942                      Bandar John MD  Department of Hospital Medicine   [facility]

## 2020-05-27 NOTE — ASSESSMENT & PLAN NOTE
5/25 Hold ASA and anticoagulants  Bronchoscopy prior to discharge  5/26 resolved  5/27 discussed with Dr. Pike, primary problem is mitral stenosis and associated hemoptysis. The primary issue to treat is the Congestive Heart failure and mitral stenosis. After this is stabilized will re-evaluate for bronchoscopy.

## 2020-05-27 NOTE — ASSESSMENT & PLAN NOTE
Copd pathway   Steroids   Breathing treatments   - Encouraging to quit smoking completely, which he started at age of 17 years, 1 PPD until age 60 years

## 2020-05-27 NOTE — SUBJECTIVE & OBJECTIVE
Interval History: sloe improvmemnt    Objective:     Vital Signs (Most Recent):  Temp: 98 °F (36.7 °C) (05/26/20 1947)  Pulse: 107 (05/26/20 2006)  Resp: 20 (05/26/20 2006)  BP: 120/65 (05/26/20 1947)  SpO2: 98 % (05/26/20 2006) Vital Signs (24h Range):  Temp:  [97.4 °F (36.3 °C)-98.4 °F (36.9 °C)] 98 °F (36.7 °C)  Pulse:  [100-136] 107  Resp:  [15-24] 20  SpO2:  [96 %-100 %] 98 %  BP: ()/(59-67) 120/65     Weight: 76.7 kg (169 lb)  Body mass index is 29.94 kg/m².      Intake/Output Summary (Last 24 hours) at 5/26/2020 2044  Last data filed at 5/26/2020 1300  Gross per 24 hour   Intake 1253 ml   Output 1125 ml   Net 128 ml       Physical Exam   Constitutional: He is oriented to person, place, and time. He appears toxic. He has a sickly appearance. He appears ill. He appears distressed.   HENT:   Head: Normocephalic.   Eyes: Pupils are equal, round, and reactive to light.   Neck: Neck supple.   Cardiovascular:   Murmur heard.  Pulmonary/Chest: He has rales.   Abdominal: Soft.   Musculoskeletal: He exhibits edema.   Neurological: He is alert and oriented to person, place, and time.   Skin: Skin is warm and dry.   Nursing note and vitals reviewed.      Vents:  Oxygen Concentration (%): 36 (05/26/20 2006)    Lines/Drains/Airways     Peripheral Intravenous Line                 Peripheral IV - Single Lumen 05/23/20 1711 20 G Left Wrist 3 days                Significant Labs:    CBC/Anemia Profile:  Recent Labs   Lab 05/25/20 0457 05/26/20  0656   WBC 21.34* 16.49*   HGB 7.9* 7.7*   HCT 27.1* 26.0*   * 367*   MCV 76* 75*   RDW 18.4* 18.4*        Chemistries:  Recent Labs   Lab 05/25/20 0457 05/26/20  0738   * 132*   K 4.3 4.4    97   CO2 24 22*   BUN 27* 35*   CREATININE 1.5* 1.6*   CALCIUM 9.3 9.4   MG 2.3 2.2   PHOS 4.3 5.0*       ABGs: No results for input(s): PH, PCO2, HCO3, POCSATURATED, BE in the last 48 hours.  Bilirubin:   Recent Labs   Lab 05/05/20  0446 05/18/20  1427 05/20/20  2978  05/21/20  0448 05/23/20  1711   BILITOT 0.4 0.9 0.7 0.5 0.5     Blood Culture: No results for input(s): LABBLOO in the last 48 hours.  BMP:   Recent Labs   Lab 05/26/20  0738   *   *   K 4.4   CL 97   CO2 22*   BUN 35*   CREATININE 1.6*   CALCIUM 9.4   MG 2.2     All pertinent labs within the past 24 hours have been reviewed.    Significant Imaging:  Past Medical History:   Diagnosis Date    CHF (congestive heart failure)     Emphysema of lung        Past Surgical History:   Procedure Laterality Date    HIP FRACTURE SURGERY         Review of patient's allergies indicates:  No Known Allergies    Family History     None        Tobacco Use    Smoking status: Current Every Day Smoker     Packs/day: 1.00     Years: 25.00     Pack years: 25.00    Smokeless tobacco: Never Used   Substance and Sexual Activity    Alcohol use: Not Currently    Drug use: Not on file    Sexual activity: Not on file         Review of Systems   Constitutional: Positive for diaphoresis and fatigue.   HENT: Positive for congestion, postnasal drip and rhinorrhea.    Respiratory: Positive for cough, chest tightness, shortness of breath, wheezing and stridor.    Cardiovascular: Positive for palpitations and leg swelling.   Gastrointestinal: Negative.    Endocrine: Negative.    Genitourinary: Negative.    Musculoskeletal: Positive for arthralgias.   Skin: Negative.    Allergic/Immunologic: Negative.    Neurological: Positive for weakness.   Hematological: Negative.      Objective:     Vital Signs (Most Recent):  Temp: 98 °F (36.7 °C) (05/26/20 1947)  Pulse: 107 (05/26/20 2006)  Resp: 20 (05/26/20 2006)  BP: 120/65 (05/26/20 1947)  SpO2: 98 % (05/26/20 2006) Vital Signs (24h Range):  Temp:  [97.4 °F (36.3 °C)-98.4 °F (36.9 °C)] 98 °F (36.7 °C)  Pulse:  [100-136] 107  Resp:  [15-24] 20  SpO2:  [96 %-100 %] 98 %  BP: ()/(59-67) 120/65     Weight: 76.7 kg (169 lb)  Body mass index is 29.94 kg/m².      Intake/Output Summary (Last  24 hours) at 5/26/2020 2054  Last data filed at 5/26/2020 1300  Gross per 24 hour   Intake 1253 ml   Output 1125 ml   Net 128 ml       Physical Exam   Constitutional: He is oriented to person, place, and time. He appears well-developed and well-nourished.   HENT:   Head: Normocephalic and atraumatic.   Mouth/Throat: Oropharyngeal exudate present.   Eyes: Pupils are equal, round, and reactive to light. Conjunctivae are normal.   Neck: Neck supple. No JVD present. No tracheal deviation present. No thyromegaly present.   Cardiovascular: Normal rate, regular rhythm and normal heart sounds.   No murmur heard.  Pulmonary/Chest: Effort normal. He has decreased breath sounds. He has wheezes in the right lower field and the left lower field. He has no rhonchi. He has no rales.   Abdominal: Soft. Bowel sounds are normal.   Musculoskeletal: Normal range of motion. He exhibits no edema or tenderness.   Lymphadenopathy:     He has no cervical adenopathy.   Neurological: He is alert and oriented to person, place, and time.   Skin: Skin is warm and dry.   Nursing note and vitals reviewed.      Vents:  Oxygen Concentration (%): 36 (05/26/20 2006)    Lines/Drains/Airways     Peripheral Intravenous Line                 Peripheral IV - Single Lumen 05/23/20 1711 20 G Left Wrist 3 days                Significant Labs:    CBC/Anemia Profile:  Recent Labs   Lab 05/25/20  0457 05/26/20  0656   WBC 21.34* 16.49*   HGB 7.9* 7.7*   HCT 27.1* 26.0*   * 367*   MCV 76* 75*   RDW 18.4* 18.4*        Chemistries:  Recent Labs   Lab 05/25/20  0457 05/26/20  0738   * 132*   K 4.3 4.4    97   CO2 24 22*   BUN 27* 35*   CREATININE 1.5* 1.6*   CALCIUM 9.3 9.4   MG 2.3 2.2   PHOS 4.3 5.0*       ABGs:   No results for input(s): PH, PCO2, HCO3, POCSATURATED, BE in the last 48 hours.  BMP:   Recent Labs   Lab 05/26/20  0738   *   *   K 4.4   CL 97   CO2 22*   BUN 35*   CREATININE 1.6*   CALCIUM 9.4   MG 2.2     CMP:   Recent  Labs   Lab 05/25/20  0457 05/26/20  0738   * 132*   K 4.3 4.4    97   CO2 24 22*   * 166*   BUN 27* 35*   CREATININE 1.5* 1.6*   CALCIUM 9.3 9.4   ANIONGAP 11 13   EGFRNONAA 50* 46*     All pertinent labs within the past 24 hours have been reviewed.    Significant Imaging:   I have reviewed all pertinent imaging results/findings within the past 24 hours.  I have reviewed and interpreted all pertinent imaging results/findings within the past 24 hours.     Echo Color Flow Doppler? Yes  · Concentric left ventricular remodeling.  · Moderate left atrial enlargement.  · Left ventricular systolic function. The estimated ejection fraction is   60%.  · Indeterminate left ventricular diastolic function.  · Septal wall has abnormal motion. Systolic flattening of the   interventricular septum consistent with right ventricle pressure overload.  · Moderate right ventricular enlargement.  · Moderately reduced right ventricular systolic function.  · Moderate right atrial enlargement.  · Moderate to severe mitral stenosis.  · Moderate tricuspid regurgitation.  · Intermediate central venous pressure (8 mmHg).  · The estimated PA systolic pressure is 110 mmHg.  · Pulmonary hypertension present.     CT Head Without Contrast  Narrative: EXAMINATION:  CT HEAD WITHOUT CONTRAST    CLINICAL HISTORY:  Head trauma, no neuro decline, f/u imaging;PT FELL IN ROOM;    TECHNIQUE:  Low dose axial CT images obtained throughout the head without intravenous contrast. Sagittal and coronal reconstructions were performed.    All CT scans at this facility use dose modulation, iterative reconstruction, and/or weight based dosing when appropriate to reduce radiation dose to as low as reasonable achievable.    COMPARISON:  None.    FINDINGS:  Intracranial compartment:    The brain parenchyma appears normal. No parenchymal mass, hemorrhage, edema or major vascular distribution infarct.    Ventricles and sulci are normal in size for age  without evidence of hydrocephalus.    No extra-axial blood or fluid collections.    Skull/extracranial contents (limited evaluation): No fracture. Mild diffuse sinus mucosal thickening.  Mastoid air cells and paranasal sinuses are essentially clear.  Impression: No acute abnormality.    All CT scans at this facility use dose modulation, iterative reconstruction, and/or weight based dosing when appropriate to reduce radiation dose to as low as reasonable achievable.    Electronically signed by: Rodrigo Maddox MD  Date:    05/26/2020  Time:    07:56  I have reviewed all pertinent imaging results/findings within the past 24 hours.  I have reviewed and interpreted all pertinent imaging results/findings within the past 24 hours.

## 2020-05-27 NOTE — ASSESSMENT & PLAN NOTE
Right sided 2/2 COPD (pulmonary HTN) 2/2 smoking and BERTHA; LE edema improved following Lasix 20 mg yesterday, but this may have contributed to his fall given his low BPs in the 90's/60's and pre-load dependant physiology.  - Consider sildenafil  - Hold furosemide

## 2020-05-27 NOTE — ASSESSMENT & PLAN NOTE
-H/H 7.0/23.7  -Recommend transfusion, discussed with hospital medicine  -Diurese in between units

## 2020-05-27 NOTE — CONSULTS
[facility]  Cardiology  Consult Note    Patient Name: David Valera  MRN: [id number]  Admission Date: 5/23/2020  Hospital Length of Stay: 1 days  Code Status: Full Code   Attending Provider: Bandar John MD   Consulting Provider: Rosalba Melvin PA-C  Primary Care Physician: Rodrigo Ames MD  Principal Problem:Acute respiratory failure with hypoxia    Patient information was obtained from patient, past medical records and ER records.     Inpatient consult to Cardiology  Consult performed by: Rosalba Melvin PA-C  Consult ordered by: Simeon Chakraborty NP        Subjective:     Chief Complaint: Shortness of breath    HPI:   Mr. Valera is a 60 year old male patient whose current medical conditions include COPD, CKD, and pulmonary HTN who presented to [facility] ED on 5/23/2020 with a chief complaint of worsening SOB. Associated symptoms included hemoptysis and fatigue. Patient denied any associated fever, chills, palpitations, near syncope, or syncope. Reported he had recently been discharged from [facility] due to similar symptoms. Initial workup in ED revealed leukocytosis and CXR showed left mid lung infiltrate and patient was subsequently admitted for further evaluation and treatment. This afternoon, patient complained of worsening SOB along with some chest pressure and cardiology was consulted to assist with management. Patient seen and examined today, sitting up in bed. Still feels poorly. Remains SOB. Endorses some left-sided pressure like discomfort that onset this AM after eating. Denies any history of CAD/MI. He reports compliance with his medications. States he has been admitted to hospital four times with similar symptoms.Chart reviewed. Troponin 0.031. EKG shows non-specific T wave abnormality, no acute ischemic changes. Creatinine 1.8. BNP 1500. H/H 7.0/23.7. Echo showed normal EF, moderately reduced RV function, severe pulmonary HTN (PA pressure 110 mmHg), mod-severe  MS.    Past Medical History:   Diagnosis Date    CHF (congestive heart failure)     Emphysema of lung        Past Surgical History:   Procedure Laterality Date    HIP FRACTURE SURGERY         Review of patient's allergies indicates:  No Known Allergies    No current facility-administered medications on file prior to encounter.      Current Outpatient Medications on File Prior to Encounter   Medication Sig    albuterol (PROAIR HFA) 90 mcg/actuation inhaler Inhale 2 puffs into the lungs every 4 (four) hours as needed for wheezing or shortness of breath    albuterol (PROVENTIL/VENTOLIN HFA) 90 mcg/actuation inhaler Inhale 2 puffs into the lungs every 4 (four) hours as needed for Wheezing or Shortness of Breath.    budesonide-formoterol 80-4.5 mcg (SYMBICORT) 80-4.5 mcg/actuation HFAA Inhale 2 puffs into the lungs every 6 to 8 hours as needed. Controller    furosemide (LASIX) 20 MG tablet Take 1 tablet (20 mg total) by mouth once daily.    [] levoFLOXacin (LEVAQUIN) 750 MG tablet Take 1 tablet (750 mg total) by mouth once daily. for 5 days    traZODone (DESYREL) 50 MG tablet Take 50 mg by mouth nightly.    tiotropium bromide (SPIRIVA RESPIMAT) 2.5 mcg/actuation Mist Inhale 2 puffs into the lungs every 6 to 8 hours as needed. Controller     Family History     None        Tobacco Use    Smoking status: Current Every Day Smoker     Packs/day: 1.00     Years: 25.00     Pack years: 25.00    Smokeless tobacco: Never Used   Substance and Sexual Activity    Alcohol use: Not Currently    Drug use: Not on file    Sexual activity: Not on file     Review of Systems   Constitution: Positive for malaise/fatigue.   HENT: Negative.    Eyes: Negative.    Cardiovascular: Positive for chest pain (chest pressure) and dyspnea on exertion.   Respiratory: Positive for cough and shortness of breath.    Endocrine: Negative.    Hematologic/Lymphatic: Negative.    Skin: Negative.    Musculoskeletal: Negative.     Gastrointestinal: Negative.    Genitourinary: Negative.    Neurological: Negative.    Psychiatric/Behavioral: Negative.    Allergic/Immunologic: Negative.      Objective:     Vital Signs (Most Recent):  Temp: 97.8 °F (36.6 °C) (05/27/20 1600)  Pulse: 95 (05/27/20 1600)  Resp: 16 (05/27/20 1600)  BP: 108/75 (05/27/20 1600)  SpO2: 97 % (05/27/20 1600) Vital Signs (24h Range):  Temp:  [97.7 °F (36.5 °C)-98.4 °F (36.9 °C)] 97.8 °F (36.6 °C)  Pulse:  [] 95  Resp:  [16-30] 16  SpO2:  [96 %-100 %] 97 %  BP: ()/(58-75) 108/75     Weight: 76.7 kg (169 lb)  Body mass index is 29.94 kg/m².    SpO2: 97 %  O2 Device (Oxygen Therapy): nasal cannula      Intake/Output Summary (Last 24 hours) at 5/27/2020 1647  Last data filed at 5/27/2020 1100  Gross per 24 hour   Intake --   Output 125 ml   Net -125 ml       Lines/Drains/Airways     Peripheral Intravenous Line                 Peripheral IV - Single Lumen 05/23/20 1711 20 G Left Wrist 3 days                Physical Exam   Constitutional: He is oriented to person, place, and time. He appears well-developed and well-nourished. No distress.   HENT:   Head: Normocephalic and atraumatic.   Eyes: Pupils are equal, round, and reactive to light. Right eye exhibits no discharge. Left eye exhibits no discharge.   Neck: Neck supple. JVD present.   Cardiovascular: Normal rate, regular rhythm, S1 normal and S2 normal.   Murmur heard.   Low-pitched rumbling crescendo presystolic murmur is present at the apex.  Pulmonary/Chest: Effort normal. No respiratory distress. He has no wheezes.   Coarse BS at bases   Abdominal: Soft. He exhibits no distension.   Musculoskeletal: He exhibits no edema.   Neurological: He is alert and oriented to person, place, and time.   Skin: Skin is warm and dry. He is not diaphoretic. No erythema.   Psychiatric: He has a normal mood and affect. His behavior is normal. Thought content normal.   Nursing note and vitals reviewed.      Significant Labs:   CMP    Recent Labs   Lab 05/26/20  0738 05/27/20  0532   * 132*   K 4.4 4.3   CL 97 98   CO2 22* 22*   * 158*   BUN 35* 46*   CREATININE 1.6* 1.8*   CALCIUM 9.4 9.3   ANIONGAP 13 12   ESTGFRAFRICA 53* 46*   EGFRNONAA 46* 40*   , CBC   Recent Labs   Lab 05/26/20  0656 05/27/20  0532   WBC 16.49* 15.05*   HGB 7.7* 7.0*   HCT 26.0* 23.7*   * 368*   , Troponin   Recent Labs   Lab 05/27/20  1318   TROPONINI 0.031*    and All pertinent lab results from the last 24 hours have been reviewed.    Significant Imaging: Echocardiogram: 2D echo with color flow doppler: No results found for this or any previous visit., EKG: REviewed and X-Ray: CXR: X-Ray Chest 1 View (CXR): No results found for this visit on 05/23/20. and X-Ray Chest PA and Lateral (CXR): No results found for this visit on 05/23/20.    Assessment and Plan:   Patient who presents with shortness of breath/respiratory failure-multifactorial in setting of severe pulmonary HTN, COPD, right-sided CHF, and anemia. Recommend transfusion. Diurese in between units. Continue steroids and abx. Needs TRUDY to assess degree of mitral stenosis. NPO after MN.    * Acute respiratory failure with hypoxia  -Multifactorial-secondary to COPD, severe pulmonary HTN, PNA, right-sided CHF, mitral stenosis,. anemia  -Continue nebs/abx/steroids  -Diurese in between transfusion  -Needs further assessment of mitral valve with RTUDY  -NPO after MN    Nonrheumatic mitral valve stenosis  -Moderate to severe by echo  -Contributing to SOB/constellation of symptoms  -Needs assessment via TRUDY. All risks, benefits, and treatment alternatives explained to patient in detail. All questions answered. He has agreed to proceed.  -NPO after MN    Acute renal failure superimposed on stage 3 chronic kidney disease  -Creatinine 1.8  -Monitor    Pneumonia  -Mgmt as per primary team  -On abx    Acute on chronic congestive heart failure  -Patient has element of right sided-heart failure  -Recommend  diuresing in between units of blood  -Will further optimize meds as BP permits  -Echo reviewed, showed normal EF, moderately reduced RV function, severe pulmonary HTN    BERTHA on CPAP  -Continue CPAP usage    Hemoptysis  -Mgmt as per pulmonary/primary team    COPD exacerbation  -Mgmt as per pulmonary    Pulmonary hypertension  -Pulmonary on board  -Secondary to COPD/valvular heart disease    Symptomatic anemia  -H/H 7.0/23.7  -Recommend transfusion, discussed with hospital medicine  -Diurese in between units        VTE Risk Mitigation (From admission, onward)         Ordered     IP VTE HIGH RISK PATIENT  Once      05/23/20 1942     Place sequential compression device  Until discontinued      05/23/20 1942                Thank you for your consult. I will follow-up with patient. Please contact us if you have any additional questions.    Rosalba Melvin PA-C  Cardiology   [facility]

## 2020-05-27 NOTE — ASSESSMENT & PLAN NOTE
Related to reduced cardiac output and episodes of coughing with reduced preload - start cough medication

## 2020-05-27 NOTE — ASSESSMENT & PLAN NOTE
Sildenafil not clearly indicated  5/27 Pulmonary Hypertension due to Congestive Heart failure from Mitral Stenosis

## 2020-05-27 NOTE — ASSESSMENT & PLAN NOTE
5/26 continue antibiotic  5/27 Infiltrates slightly worse - suspect due to Congestive Heart failure  Patient does not appear to be toxic. WBC count may be due to steroids - will reduce IV solumedrol

## 2020-05-27 NOTE — TELEPHONE ENCOUNTER
----- Message from Casandra Duke sent at 5/27/2020  2:43 PM CDT -----  Contact: [facility]  Type:  Patient Returning Call    Who Called: Joe  Who Left Message for Patient: staff  Does the patient know what this is regarding?:na/  Would the patient rather a call back or a response via [facility]? Call back  Best Call Back Number:[phone]  Additional Information: please call back Thanks.

## 2020-05-27 NOTE — PROGRESS NOTES
Contacted by RN stating that patient was in distress. Upon arrival, patient's RR 22 and SpO2 on 4LNC 98%. PRN Duoneb given, breath sounds diminished. Patient resting comfortably post treatment.

## 2020-05-27 NOTE — PROGRESS NOTES
Pharmacokinetic Assessment Follow Up: IV Vancomycin    Vancomycin serum concentration assessment(s):    The trough level was drawn correctly and can be used to guide therapy at this time. The measurement is within the desired definitive target range of 15 to 20 mcg/mL.    Vancomycin Regimen Plan:    Continue regimen to Vancomycin 1250 mg IV every 24 hours with next serum trough concentration measured at 23:30 prior to 3rd dose on 5/28    Drug levels (last 3 results):  Recent Labs   Lab Result Units 05/24/20 2033 05/26/20  2300   Vancomycin, Random ug/mL 8.1  --    Vancomycin-Trough ug/mL  --  15.2       Pharmacy will continue to follow and monitor vancomycin.    Please contact pharmacy at extension [phone] for questions regarding this assessment.    Thank you for the consult,   Gerald Duque       Patient brief summary:  David Valera is a 60 y.o. male initiated on antimicrobial therapy with IV Vancomycin for treatment of pneumonia      Drug Allergies:   Review of patient's allergies indicates:  No Known Allergies    Actual Body Weight:   76.7kg    Renal Function:   Estimated Creatinine Clearance: 45 mL/min (A) (based on SCr of 1.6 mg/dL (H)).,     Dialysis Method (if applicable):  N/A    CBC (last 72 hours):  Recent Labs   Lab Result Units 05/24/20 0437 05/25/20 0457 05/26/20  0656   WBC K/uL 22.02* 21.34* 16.49*   Hemoglobin g/dL 7.8* 7.9* 7.7*   Hematocrit % 26.5* 27.1* 26.0*   Platelets K/uL 361* 360* 367*   Gran% % 85.0* 83.6* 85.4*   Lymph% % 7.0* 9.1* 7.6*   Mono% % 5.9 5.9 6.1   Eosinophil% % 1.0 0.5 0.0   Basophil% % 0.4 0.2 0.1   Differential Method  Automated Automated Automated       Metabolic Panel (last 72 hours):  Recent Labs   Lab Result Units 05/24/20 0437 05/25/20  0457 05/26/20  0738   Sodium mmol/L 135* 135* 132*   Potassium mmol/L 4.3 4.3 4.4   Chloride mmol/L 102 100 97   CO2 mmol/L 25 24 22*   Glucose mg/dL 123* 116* 166*   BUN, Bld mg/dL 30* 27* 35*   Creatinine mg/dL 1.8* 1.5* 1.6*    Magnesium mg/dL 2.0 2.3 2.2   Phosphorus mg/dL 4.4 4.3 5.0*       Vancomycin Administrations:  vancomycin given in the last 96 hours                   vancomycin (VANCOCIN) 1,250 mg in dextrose 5 % 250 mL IVPB (mg) 1,250 mg New Bag 05/27/20 0016     1,250 mg New Bag 05/26/20 0023     1,250 mg New Bag 05/24/20 2359                Microbiologic Results:  Microbiology Results (last 7 days)     Procedure Component Value Units Date/Time    Culture, MRSA [[id number]] Collected:  05/24/20 0800    Order Status:  Completed Specimen:  MRSA source from Nares, Left Updated:  05/26/20 1300     MRSA Surveillance Screen No MRSA isolated    Blood culture #1 [[id number]] Collected:  05/23/20 1712    Order Status:  Completed Specimen:  Blood from Peripheral, Wrist, Left Updated:  05/26/20 1012     Blood Culture, Routine No Growth to date      No Growth to date      No Growth to date    Blood culture #2 [[id number]] Collected:  05/23/20 1731    Order Status:  Completed Specimen:  Blood from Peripheral, Upper Arm, Left Updated:  05/26/20 1012     Blood Culture, Routine No Growth to date      No Growth to date      No Growth to date    Culture, Respiratory with Gram Stain [[id number]] Collected:  05/24/20 0803    Order Status:  Completed Specimen:  Respiratory from Sputum, Expectorated Updated:  05/26/20 0948     Respiratory Culture Specimen inadequate - culture not performed     Gram Stain (Respiratory) >10 epithelial cells per low power field     Gram Stain (Respiratory) Predominance of oropharyngeal isai. Please recollect.    Influenza A & B by Molecular [[id number]] Collected:  05/23/20 2039    Order Status:  Completed Specimen:  Nasopharyngeal Swab Updated:  05/23/20 2219     Influenza A, Molecular Negative     Influenza B, Molecular Negative     Flu A & B Source Nasal swab

## 2020-05-27 NOTE — ASSESSMENT & PLAN NOTE
-non resolving pneumonia failed levaquin   -started vanc/cefepime on 5/24/20  -resp culture blood culture   -procal,nasal mrsa   -cxr shows left sided consolidation  -Pulm consulted due to severe pulmonary HTN and for any further recs given patient's multiple hosp admissions w/ hypoxia, who plans to check bronchoscopy prior to DC

## 2020-05-27 NOTE — PLAN OF CARE
POC reviewed, verbalized understanding. Pt remained free from falls, fall precautions in place. Pt is SR ST on monitor, . VSS.B/P still low  complaints of headache tylenol ordered. Pt also complain of SOB with chest discomfort  cardiac enzymes ekg, cxr done h/h low this am 7.0 23.7 will get blood . Solumedrol decreased to 40 NPO midnight per cards.  Troponin 0.031 bnp 1.545 cpk 123 ck-mb 3.4 ekg NSR CXR shows pnemonia next troponin at 2000 pm tonight abx continue  along with steriods on 2l nc now sat 96% pt also has a history of anixety PIV intact. Call bell and personal belongings within reach. Hourly rounding complete. Reminded to call for assistance. Will continue to monitor.     1742 pt will received 2 units of blood tonight with lasix in between

## 2020-05-27 NOTE — ASSESSMENT & PLAN NOTE
-Patient has element of right sided-heart failure  -Recommend diuresing in between units of blood  -Will further optimize meds as BP permits  -Echo reviewed, showed normal EF, moderately reduced RV function, severe pulmonary HTN

## 2020-05-27 NOTE — NURSING
Notified pt had fallen. Upon entrance to the room, pt was sitting upright on floor. Bed alarm was in use before fall occurred. It is suspected that patient disengaged bed alarm when fall transpired. Pt was moved to a room closer to the nurses station and a sitter was placed in room. IBED was engaged so as to prevent alarm tampering. No harm occurred, no new orders placed, MD aware, House Sup aware, staff educated, management notified.

## 2020-05-27 NOTE — SUBJECTIVE & OBJECTIVE
Past Medical History:   Diagnosis Date    CHF (congestive heart failure)     Emphysema of lung        Past Surgical History:   Procedure Laterality Date    HIP FRACTURE SURGERY         Review of patient's allergies indicates:  No Known Allergies    No current facility-administered medications on file prior to encounter.      Current Outpatient Medications on File Prior to Encounter   Medication Sig    albuterol (PROAIR HFA) 90 mcg/actuation inhaler Inhale 2 puffs into the lungs every 4 (four) hours as needed for wheezing or shortness of breath    albuterol (PROVENTIL/VENTOLIN HFA) 90 mcg/actuation inhaler Inhale 2 puffs into the lungs every 4 (four) hours as needed for Wheezing or Shortness of Breath.    budesonide-formoterol 80-4.5 mcg (SYMBICORT) 80-4.5 mcg/actuation HFAA Inhale 2 puffs into the lungs every 6 to 8 hours as needed. Controller    furosemide (LASIX) 20 MG tablet Take 1 tablet (20 mg total) by mouth once daily.    [] levoFLOXacin (LEVAQUIN) 750 MG tablet Take 1 tablet (750 mg total) by mouth once daily. for 5 days    traZODone (DESYREL) 50 MG tablet Take 50 mg by mouth nightly.    tiotropium bromide (SPIRIVA RESPIMAT) 2.5 mcg/actuation Mist Inhale 2 puffs into the lungs every 6 to 8 hours as needed. Controller     Family History     None        Tobacco Use    Smoking status: Current Every Day Smoker     Packs/day: 1.00     Years: 25.00     Pack years: 25.00    Smokeless tobacco: Never Used   Substance and Sexual Activity    Alcohol use: Not Currently    Drug use: Not on file    Sexual activity: Not on file     Review of Systems   Constitution: Positive for malaise/fatigue.   HENT: Negative.    Eyes: Negative.    Cardiovascular: Positive for chest pain (chest pressure) and dyspnea on exertion.   Respiratory: Positive for cough and shortness of breath.    Endocrine: Negative.    Hematologic/Lymphatic: Negative.    Skin: Negative.    Musculoskeletal: Negative.    Gastrointestinal:  Negative.    Genitourinary: Negative.    Neurological: Negative.    Psychiatric/Behavioral: Negative.    Allergic/Immunologic: Negative.      Objective:     Vital Signs (Most Recent):  Temp: 97.8 °F (36.6 °C) (05/27/20 1600)  Pulse: 95 (05/27/20 1600)  Resp: 16 (05/27/20 1600)  BP: 108/75 (05/27/20 1600)  SpO2: 97 % (05/27/20 1600) Vital Signs (24h Range):  Temp:  [97.7 °F (36.5 °C)-98.4 °F (36.9 °C)] 97.8 °F (36.6 °C)  Pulse:  [] 95  Resp:  [16-30] 16  SpO2:  [96 %-100 %] 97 %  BP: ()/(58-75) 108/75     Weight: 76.7 kg (169 lb)  Body mass index is 29.94 kg/m².    SpO2: 97 %  O2 Device (Oxygen Therapy): nasal cannula      Intake/Output Summary (Last 24 hours) at 5/27/2020 1647  Last data filed at 5/27/2020 1100  Gross per 24 hour   Intake --   Output 125 ml   Net -125 ml       Lines/Drains/Airways     Peripheral Intravenous Line                 Peripheral IV - Single Lumen 05/23/20 1711 20 G Left Wrist 3 days                Physical Exam   Constitutional: He is oriented to person, place, and time. He appears well-developed and well-nourished. No distress.   HENT:   Head: Normocephalic and atraumatic.   Eyes: Pupils are equal, round, and reactive to light. Right eye exhibits no discharge. Left eye exhibits no discharge.   Neck: Neck supple. JVD present.   Cardiovascular: Normal rate, regular rhythm, S1 normal and S2 normal.   Murmur heard.   Low-pitched rumbling crescendo presystolic murmur is present at the apex.  Pulmonary/Chest: Effort normal. No respiratory distress. He has no wheezes.   Coarse BS at bases   Abdominal: Soft. He exhibits no distension.   Musculoskeletal: He exhibits no edema.   Neurological: He is alert and oriented to person, place, and time.   Skin: Skin is warm and dry. He is not diaphoretic. No erythema.   Psychiatric: He has a normal mood and affect. His behavior is normal. Thought content normal.   Nursing note and vitals reviewed.      Significant Labs:   CMP   Recent Labs   Lab  05/26/20  0738 05/27/20  0532   * 132*   K 4.4 4.3   CL 97 98   CO2 22* 22*   * 158*   BUN 35* 46*   CREATININE 1.6* 1.8*   CALCIUM 9.4 9.3   ANIONGAP 13 12   ESTGFRAFRICA 53* 46*   EGFRNONAA 46* 40*   , CBC   Recent Labs   Lab 05/26/20  0656 05/27/20  0532   WBC 16.49* 15.05*   HGB 7.7* 7.0*   HCT 26.0* 23.7*   * 368*   , Troponin   Recent Labs   Lab 05/27/20  1318   TROPONINI 0.031*    and All pertinent lab results from the last 24 hours have been reviewed.    Significant Imaging: Echocardiogram: 2D echo with color flow doppler: No results found for this or any previous visit., EKG: REviewed and X-Ray: CXR: X-Ray Chest 1 View (CXR): No results found for this visit on 05/23/20. and X-Ray Chest PA and Lateral (CXR): No results found for this visit on 05/23/20.

## 2020-05-27 NOTE — SUBJECTIVE & OBJECTIVE
Interval History: episodes of cough syncope    Objective:     Vital Signs (Most Recent):  Temp: 97.8 °F (36.6 °C) (05/27/20 1600)  Pulse: 95 (05/27/20 1600)  Resp: 16 (05/27/20 1600)  BP: 108/75 (05/27/20 1600)  SpO2: 97 % (05/27/20 1600) Vital Signs (24h Range):  Temp:  [97.7 °F (36.5 °C)-98.4 °F (36.9 °C)] 97.8 °F (36.6 °C)  Pulse:  [] 95  Resp:  [16-30] 16  SpO2:  [96 %-100 %] 97 %  BP: ()/(58-75) 108/75     Weight: 76.7 kg (169 lb)  Body mass index is 29.94 kg/m².      Intake/Output Summary (Last 24 hours) at 5/27/2020 1703  Last data filed at 5/27/2020 1100  Gross per 24 hour   Intake --   Output 125 ml   Net -125 ml       Physical Exam   Constitutional: He is oriented to person, place, and time. He appears toxic. He has a sickly appearance. He appears ill. He appears distressed.   HENT:   Head: Normocephalic.   Eyes: Pupils are equal, round, and reactive to light.   Neck: Neck supple.   Cardiovascular:   Murmur heard.  Pulmonary/Chest: He has rales.   Abdominal: Soft.   Musculoskeletal: He exhibits edema.   Neurological: He is alert and oriented to person, place, and time.   Skin: Skin is warm and dry.   Nursing note and vitals reviewed.      Vents:  Oxygen Concentration (%): 3 (05/27/20 0423)    Lines/Drains/Airways     Peripheral Intravenous Line                 Peripheral IV - Single Lumen 05/23/20 1711 20 G Left Wrist 3 days                Significant Labs:    CBC/Anemia Profile:  Recent Labs   Lab 05/26/20  0656 05/27/20  0532   WBC 16.49* 15.05*   HGB 7.7* 7.0*   HCT 26.0* 23.7*   * 368*   MCV 75* 73*   RDW 18.4* 18.6*        Chemistries:  Recent Labs   Lab 05/26/20  0738 05/27/20  0532   * 132*   K 4.4 4.3   CL 97 98   CO2 22* 22*   BUN 35* 46*   CREATININE 1.6* 1.8*   CALCIUM 9.4 9.3   MG 2.2 2.3   PHOS 5.0* 4.7*       ABGs: No results for input(s): PH, PCO2, HCO3, POCSATURATED, BE in the last 48 hours.  Bilirubin:   Recent Labs   Lab 05/05/20  0446 05/18/20  4432  05/20/20  0501 05/21/20  0448 05/23/20  1711   BILITOT 0.4 0.9 0.7 0.5 0.5     Blood Culture: No results for input(s): LABBLOO in the last 48 hours.  BMP:   Recent Labs   Lab 05/27/20  0532   *   *   K 4.3   CL 98   CO2 22*   BUN 46*   CREATININE 1.8*   CALCIUM 9.3   MG 2.3     All pertinent labs within the past 24 hours have been reviewed.    Significant Imaging:  Past Medical History:   Diagnosis Date    CHF (congestive heart failure)     Emphysema of lung        Past Surgical History:   Procedure Laterality Date    HIP FRACTURE SURGERY         Review of patient's allergies indicates:  No Known Allergies    Family History     None        Tobacco Use    Smoking status: Current Every Day Smoker     Packs/day: 1.00     Years: 25.00     Pack years: 25.00    Smokeless tobacco: Never Used   Substance and Sexual Activity    Alcohol use: Not Currently    Drug use: Not on file    Sexual activity: Not on file         Review of Systems   Constitutional: Positive for diaphoresis and fatigue.   HENT: Positive for congestion, postnasal drip and rhinorrhea.    Respiratory: Positive for cough, chest tightness, shortness of breath, wheezing and stridor.    Cardiovascular: Positive for palpitations and leg swelling.   Gastrointestinal: Negative.    Endocrine: Negative.    Genitourinary: Negative.    Musculoskeletal: Positive for arthralgias.   Skin: Negative.    Allergic/Immunologic: Negative.    Neurological: Positive for weakness.   Hematological: Negative.      Objective:     Vital Signs (Most Recent):  Temp: 97.8 °F (36.6 °C) (05/27/20 1600)  Pulse: 95 (05/27/20 1600)  Resp: 16 (05/27/20 1600)  BP: 108/75 (05/27/20 1600)  SpO2: 97 % (05/27/20 1600) Vital Signs (24h Range):  Temp:  [97.7 °F (36.5 °C)-98.4 °F (36.9 °C)] 97.8 °F (36.6 °C)  Pulse:  [] 95  Resp:  [16-30] 16  SpO2:  [96 %-100 %] 97 %  BP: ()/(58-75) 108/75     Weight: 76.7 kg (169 lb)  Body mass index is 29.94  kg/m².      Intake/Output Summary (Last 24 hours) at 5/27/2020 1703  Last data filed at 5/27/2020 1100  Gross per 24 hour   Intake --   Output 125 ml   Net -125 ml       Physical Exam   Constitutional: He is oriented to person, place, and time. He appears well-developed and well-nourished.   HENT:   Head: Normocephalic and atraumatic.   Mouth/Throat: Oropharyngeal exudate present.   Eyes: Pupils are equal, round, and reactive to light. Conjunctivae are normal.   Neck: Neck supple. No JVD present. No tracheal deviation present. No thyromegaly present.   Cardiovascular: Normal rate, regular rhythm and normal heart sounds.   No murmur heard.  Pulmonary/Chest: Effort normal. He has decreased breath sounds. He has wheezes in the right lower field and the left lower field. He has no rhonchi. He has no rales.   Abdominal: Soft. Bowel sounds are normal.   Musculoskeletal: Normal range of motion. He exhibits no edema or tenderness.   Lymphadenopathy:     He has no cervical adenopathy.   Neurological: He is alert and oriented to person, place, and time.   Skin: Skin is warm and dry.   Nursing note and vitals reviewed.      Vents:  Oxygen Concentration (%): 3 (05/27/20 0423)    Lines/Drains/Airways     Peripheral Intravenous Line                 Peripheral IV - Single Lumen 05/23/20 1711 20 G Left Wrist 3 days                Significant Labs:    CBC/Anemia Profile:  Recent Labs   Lab 05/26/20  0656 05/27/20  0532   WBC 16.49* 15.05*   HGB 7.7* 7.0*   HCT 26.0* 23.7*   * 368*   MCV 75* 73*   RDW 18.4* 18.6*        Chemistries:  Recent Labs   Lab 05/26/20  0738 05/27/20  0532   * 132*   K 4.4 4.3   CL 97 98   CO2 22* 22*   BUN 35* 46*   CREATININE 1.6* 1.8*   CALCIUM 9.4 9.3   MG 2.2 2.3   PHOS 5.0* 4.7*       ABGs:   No results for input(s): PH, PCO2, HCO3, POCSATURATED, BE in the last 48 hours.  BMP:   Recent Labs   Lab 05/27/20  0532   *   *   K 4.3   CL 98   CO2 22*   BUN 46*   CREATININE 1.8*    CALCIUM 9.3   MG 2.3     CMP:   Recent Labs   Lab 05/26/20  0738 05/27/20  0532   * 132*   K 4.4 4.3   CL 97 98   CO2 22* 22*   * 158*   BUN 35* 46*   CREATININE 1.6* 1.8*   CALCIUM 9.4 9.3   ANIONGAP 13 12   EGFRNONAA 46* 40*     All pertinent labs within the past 24 hours have been reviewed.    Significant Imaging:   I have reviewed all pertinent imaging results/findings within the past 24 hours.  I have reviewed and interpreted all pertinent imaging results/findings within the past 24 hours.     X-Ray Chest AP Portable  Narrative: EXAMINATION:  XR CHEST AP PORTABLE    CLINICAL HISTORY:  shortness of breath;    TECHNIQUE:  Single frontal view of the chest was performed.    COMPARISON:  05/23/2020    FINDINGS:  Patchy infiltrates are seen throughout the mid and lower lung zones which is slightly progressed from prior exam.  Mild cardiomegaly.  Aorta demonstrates atherosclerotic disease.  No pleural effusion or pneumothorax.  Bones demonstrate mild degenerative changes in comparison to the prior study, there is no adverse interval changes.  Impression: Patchy infiltrates are seen throughout the mid and lower lung zones which is slightly progressed from prior exam.    Electronically signed by: Rodrigo Maddox MD  Date:    05/27/2020  Time:    13:38  I have reviewed all pertinent imaging results/findings within the past 24 hours.  I have reviewed and interpreted all pertinent imaging results/findings within the past 24 hours.

## 2020-05-27 NOTE — HPI
Mr. Valera is a 60 year old male patient whose current medical conditions include COPD, CKD, and pulmonary HTN who presented to [facility] ED on 5/23/2020 with a chief complaint of worsening SOB. Associated symptoms included hemoptysis and fatigue. Patient denied any associated fever, chills, palpitations, near syncope, or syncope. Reported he had recently been discharged from [facility] due to similar symptoms. Initial workup in ED revealed leukocytosis and CXR showed left mid lung infiltrate and patient was subsequently admitted for further evaluation and treatment. This afternoon, patient complained of worsening SOB along with some chest pressure and cardiology was consulted to assist with management. Patient seen and examined today, sitting up in bed. Still feels poorly. Remains SOB. Endorses some left-sided pressure like discomfort that onset this AM after eating. Denies any history of CAD/MI. He reports compliance with his medications. States he has been admitted to hospital four times with similar symptoms.Chart reviewed. Troponin 0.031. EKG shows non-specific T wave abnormality, no acute ischemic changes. Creatinine 1.8. BNP 1500. H/H 7.0/23.7. Echo showed normal EF, moderately reduced RV function, severe pulmonary HTN (PA pressure 110 mmHg), mod-severe MS.

## 2020-05-27 NOTE — PLAN OF CARE
Patient is AAO x4.  Patient able to make needs known.  Patient had 2 falls on 5/26/20. Patient is suspect to be shutting off the bed alarm and has required a sitter for monitoring.  Patient is on 4L NC and sating 97-99%. Will desat once O2 is removed.  Patient is not steady on his feet and becomes SOB quickly during ambulation.  Patient denies pain of any kind.  Plan of care has been reviewed with the patient and he reports that he understands.  Patient is moving/turning independently in the bed. Frequent weight shifting encouraged.   Patient sinus rhythm on monitor.  Bed low, side rails up x2, wheels locked, call light in reach.   Will continue to monitor patient.

## 2020-05-27 NOTE — PROGRESS NOTES
[facility] -   Pulmonology  Progress Note    Patient Name: David Valera  MRN: [id number]  Admission Date: 5/23/2020  Hospital Length of Stay: 0 days  Code Status: Full Code  Attending Provider: Bandar John MD  Primary Care Provider: Rodrigo Ames MD   Principal Problem: Acute respiratory failure with hypoxia    Subjective: fallling     Interval History: sloe improvmemnt    Objective:     Vital Signs (Most Recent):  Temp: 98 °F (36.7 °C) (05/26/20 1947)  Pulse: 107 (05/26/20 2006)  Resp: 20 (05/26/20 2006)  BP: 120/65 (05/26/20 1947)  SpO2: 98 % (05/26/20 2006) Vital Signs (24h Range):  Temp:  [97.4 °F (36.3 °C)-98.4 °F (36.9 °C)] 98 °F (36.7 °C)  Pulse:  [100-136] 107  Resp:  [15-24] 20  SpO2:  [96 %-100 %] 98 %  BP: ()/(59-67) 120/65     Weight: 76.7 kg (169 lb)  Body mass index is 29.94 kg/m².      Intake/Output Summary (Last 24 hours) at 5/26/2020 2044  Last data filed at 5/26/2020 1300  Gross per 24 hour   Intake 1253 ml   Output 1125 ml   Net 128 ml       Physical Exam   Constitutional: He is oriented to person, place, and time. He appears toxic. He has a sickly appearance. He appears ill. He appears distressed.   HENT:   Head: Normocephalic.   Eyes: Pupils are equal, round, and reactive to light.   Neck: Neck supple.   Cardiovascular:   Murmur heard.  Pulmonary/Chest: He has rales.   Abdominal: Soft.   Musculoskeletal: He exhibits edema.   Neurological: He is alert and oriented to person, place, and time.   Skin: Skin is warm and dry.   Nursing note and vitals reviewed.      Vents:  Oxygen Concentration (%): 36 (05/26/20 2006)    Lines/Drains/Airways     Peripheral Intravenous Line                 Peripheral IV - Single Lumen 05/23/20 1711 20 G Left Wrist 3 days                Significant Labs:    CBC/Anemia Profile:  Recent Labs   Lab 05/25/20  0457 05/26/20  0656   WBC 21.34* 16.49*   HGB 7.9* 7.7*   HCT 27.1* 26.0*   * 367*   MCV 76* 75*   RDW 18.4* 18.4*         Chemistries:  Recent Labs   Lab 05/25/20  0457 05/26/20  0738   * 132*   K 4.3 4.4    97   CO2 24 22*   BUN 27* 35*   CREATININE 1.5* 1.6*   CALCIUM 9.3 9.4   MG 2.3 2.2   PHOS 4.3 5.0*       ABGs: No results for input(s): PH, PCO2, HCO3, POCSATURATED, BE in the last 48 hours.  Bilirubin:   Recent Labs   Lab 05/05/20  0446 05/18/20  1427 05/20/20  0501 05/21/20  0448 05/23/20  1711   BILITOT 0.4 0.9 0.7 0.5 0.5     Blood Culture: No results for input(s): LABBLOO in the last 48 hours.  BMP:   Recent Labs   Lab 05/26/20  0738   *   *   K 4.4   CL 97   CO2 22*   BUN 35*   CREATININE 1.6*   CALCIUM 9.4   MG 2.2     All pertinent labs within the past 24 hours have been reviewed.    Significant Imaging:  Past Medical History:   Diagnosis Date    CHF (congestive heart failure)     Emphysema of lung        Past Surgical History:   Procedure Laterality Date    HIP FRACTURE SURGERY         Review of patient's allergies indicates:  No Known Allergies    Family History     None        Tobacco Use    Smoking status: Current Every Day Smoker     Packs/day: 1.00     Years: 25.00     Pack years: 25.00    Smokeless tobacco: Never Used   Substance and Sexual Activity    Alcohol use: Not Currently    Drug use: Not on file    Sexual activity: Not on file         Review of Systems   Constitutional: Positive for diaphoresis and fatigue.   HENT: Positive for congestion, postnasal drip and rhinorrhea.    Respiratory: Positive for cough, chest tightness, shortness of breath, wheezing and stridor.    Cardiovascular: Positive for palpitations and leg swelling.   Gastrointestinal: Negative.    Endocrine: Negative.    Genitourinary: Negative.    Musculoskeletal: Positive for arthralgias.   Skin: Negative.    Allergic/Immunologic: Negative.    Neurological: Positive for weakness.   Hematological: Negative.      Objective:     Vital Signs (Most Recent):  Temp: 98 °F (36.7 °C) (05/26/20 1947)  Pulse: 107  (05/26/20 2006)  Resp: 20 (05/26/20 2006)  BP: 120/65 (05/26/20 1947)  SpO2: 98 % (05/26/20 2006) Vital Signs (24h Range):  Temp:  [97.4 °F (36.3 °C)-98.4 °F (36.9 °C)] 98 °F (36.7 °C)  Pulse:  [100-136] 107  Resp:  [15-24] 20  SpO2:  [96 %-100 %] 98 %  BP: ()/(59-67) 120/65     Weight: 76.7 kg (169 lb)  Body mass index is 29.94 kg/m².      Intake/Output Summary (Last 24 hours) at 5/26/2020 2054  Last data filed at 5/26/2020 1300  Gross per 24 hour   Intake 1253 ml   Output 1125 ml   Net 128 ml       Physical Exam   Constitutional: He is oriented to person, place, and time. He appears well-developed and well-nourished.   HENT:   Head: Normocephalic and atraumatic.   Mouth/Throat: Oropharyngeal exudate present.   Eyes: Pupils are equal, round, and reactive to light. Conjunctivae are normal.   Neck: Neck supple. No JVD present. No tracheal deviation present. No thyromegaly present.   Cardiovascular: Normal rate, regular rhythm and normal heart sounds.   No murmur heard.  Pulmonary/Chest: Effort normal. He has decreased breath sounds. He has wheezes in the right lower field and the left lower field. He has no rhonchi. He has no rales.   Abdominal: Soft. Bowel sounds are normal.   Musculoskeletal: Normal range of motion. He exhibits no edema or tenderness.   Lymphadenopathy:     He has no cervical adenopathy.   Neurological: He is alert and oriented to person, place, and time.   Skin: Skin is warm and dry.   Nursing note and vitals reviewed.      Vents:  Oxygen Concentration (%): 36 (05/26/20 2006)    Lines/Drains/Airways     Peripheral Intravenous Line                 Peripheral IV - Single Lumen 05/23/20 1711 20 G Left Wrist 3 days                Significant Labs:    CBC/Anemia Profile:  Recent Labs   Lab 05/25/20  0457 05/26/20  0656   WBC 21.34* 16.49*   HGB 7.9* 7.7*   HCT 27.1* 26.0*   * 367*   MCV 76* 75*   RDW 18.4* 18.4*        Chemistries:  Recent Labs   Lab 05/25/20  0457 05/26/20  0738   *  132*   K 4.3 4.4    97   CO2 24 22*   BUN 27* 35*   CREATININE 1.5* 1.6*   CALCIUM 9.3 9.4   MG 2.3 2.2   PHOS 4.3 5.0*       ABGs:   No results for input(s): PH, PCO2, HCO3, POCSATURATED, BE in the last 48 hours.  BMP:   Recent Labs   Lab 05/26/20  0738   *   *   K 4.4   CL 97   CO2 22*   BUN 35*   CREATININE 1.6*   CALCIUM 9.4   MG 2.2     CMP:   Recent Labs   Lab 05/25/20  0457 05/26/20  0738   * 132*   K 4.3 4.4    97   CO2 24 22*   * 166*   BUN 27* 35*   CREATININE 1.5* 1.6*   CALCIUM 9.3 9.4   ANIONGAP 11 13   EGFRNONAA 50* 46*     All pertinent labs within the past 24 hours have been reviewed.    Significant Imaging:   I have reviewed all pertinent imaging results/findings within the past 24 hours.  I have reviewed and interpreted all pertinent imaging results/findings within the past 24 hours.     Echo Color Flow Doppler? Yes  · Concentric left ventricular remodeling.  · Moderate left atrial enlargement.  · Left ventricular systolic function. The estimated ejection fraction is   60%.  · Indeterminate left ventricular diastolic function.  · Septal wall has abnormal motion. Systolic flattening of the   interventricular septum consistent with right ventricle pressure overload.  · Moderate right ventricular enlargement.  · Moderately reduced right ventricular systolic function.  · Moderate right atrial enlargement.  · Moderate to severe mitral stenosis.  · Moderate tricuspid regurgitation.  · Intermediate central venous pressure (8 mmHg).  · The estimated PA systolic pressure is 110 mmHg.  · Pulmonary hypertension present.     CT Head Without Contrast  Narrative: EXAMINATION:  CT HEAD WITHOUT CONTRAST    CLINICAL HISTORY:  Head trauma, no neuro decline, f/u imaging;PT FELL IN ROOM;    TECHNIQUE:  Low dose axial CT images obtained throughout the head without intravenous contrast. Sagittal and coronal reconstructions were performed.    All CT scans at this facility use dose  modulation, iterative reconstruction, and/or weight based dosing when appropriate to reduce radiation dose to as low as reasonable achievable.    COMPARISON:  None.    FINDINGS:  Intracranial compartment:    The brain parenchyma appears normal. No parenchymal mass, hemorrhage, edema or major vascular distribution infarct.    Ventricles and sulci are normal in size for age without evidence of hydrocephalus.    No extra-axial blood or fluid collections.    Skull/extracranial contents (limited evaluation): No fracture. Mild diffuse sinus mucosal thickening.  Mastoid air cells and paranasal sinuses are essentially clear.  Impression: No acute abnormality.    All CT scans at this facility use dose modulation, iterative reconstruction, and/or weight based dosing when appropriate to reduce radiation dose to as low as reasonable achievable.    Electronically signed by: Rodrigo Maddox MD  Date:    05/26/2020  Time:    07:56  I have reviewed all pertinent imaging results/findings within the past 24 hours.  I have reviewed and interpreted all pertinent imaging results/findings within the past 24 hours.      ABG  Recent Labs   Lab 05/23/20 2048   PH 7.471*   PO2 65*   PCO2 30.4*   HCO3 22.2*   BE -1     Assessment/Plan:     * Acute respiratory failure with hypoxia  5/25 bronchodilators, steroids, jet nebs    Pneumonia  5/26 continue antibiotic    Hemoptysis  5/25 Hold ASA and anticoagulants  Bronchoscopy prior to discharge  5/26 resolved           Juice Rock MD  Pulmonology  [facility] -

## 2020-05-27 NOTE — ASSESSMENT & PLAN NOTE
-Multifactorial-secondary to COPD, severe pulmonary HTN, PNA, right-sided CHF, mitral stenosis,. anemia  -Continue nebs/abx/steroids  -Diurese in between transfusion  -Needs further assessment of mitral valve with TRUDY  -NPO after MN

## 2020-05-27 NOTE — SIGNIFICANT EVENT
Deterioration alert received, patient report some shortness of breath and chest tightness, O 2 sats % on 2 L, respiratory rate elevated. Will check BNP and troponin. Consult cardiology for further evaluation.

## 2020-05-27 NOTE — SUBJECTIVE & OBJECTIVE
Interval History: Fell while up to restroom and hit head; head CT WNL; no residual deficits or pain; resolution of hemoptasis    Review of Systems   Constitutional: Negative for activity change and appetite change.   HENT: Negative for congestion, rhinorrhea and sore throat.    Eyes: Negative for discharge and redness.   Respiratory: Positive for cough (assoc with hemoptasis) and shortness of breath. Negative for chest tightness and wheezing.    Cardiovascular: Negative for chest pain and leg swelling.   Gastrointestinal: Negative for abdominal pain, diarrhea and vomiting.   Endocrine: Negative for cold intolerance, heat intolerance and polyuria.   Genitourinary: Negative for difficulty urinating, dysuria, flank pain, hematuria and urgency.   Musculoskeletal: Negative for arthralgias, back pain and myalgias.   Skin: Negative for color change and pallor.   Allergic/Immunologic: Negative for immunocompromised state.   Neurological: Negative for dizziness, light-headedness and headaches.   Hematological: Negative for adenopathy.   Psychiatric/Behavioral: Negative for agitation and confusion. The patient is not nervous/anxious.      Objective:     Vital Signs (Most Recent):  Temp: 97.7 °F (36.5 °C) (05/26/20 2338)  Pulse: 110 (05/26/20 2338)  Resp: 18 (05/26/20 2338)  BP: 96/66 (05/26/20 2338)  SpO2: 99 % (05/26/20 2338) Vital Signs (24h Range):  Temp:  [97.4 °F (36.3 °C)-98.4 °F (36.9 °C)] 97.7 °F (36.5 °C)  Pulse:  [100-136] 110  Resp:  [15-25] 18  SpO2:  [97 %-100 %] 99 %  BP: ()/(59-67) 96/66     Weight: 76.7 kg (169 lb)  Body mass index is 29.94 kg/m².    Intake/Output Summary (Last 24 hours) at 5/27/2020 0053  Last data filed at 5/26/2020 1300  Gross per 24 hour   Intake 1253 ml   Output 1125 ml   Net 128 ml      Physical Exam   Constitutional: He is oriented to person, place, and time. He appears well-developed and well-nourished.   HENT:   Head: Normocephalic and atraumatic.   Eyes: Pupils are equal,  round, and reactive to light. Conjunctivae are normal. Right eye exhibits no discharge. Left eye exhibits no discharge. No scleral icterus.   Neck: Neck supple. No JVD present. No tracheal deviation present. No thyromegaly present.   Cardiovascular: Normal rate, regular rhythm and normal heart sounds.   No murmur heard.  Pulmonary/Chest: Effort normal. No respiratory distress. He has decreased breath sounds. He has wheezes in the right lower field and the left lower field. He has no rhonchi. He has no rales.   Abdominal: Soft. Bowel sounds are normal. He exhibits no distension.   Musculoskeletal: Normal range of motion. He exhibits no edema or tenderness.   Lymphadenopathy:     He has no cervical adenopathy.   Neurological: He is alert and oriented to person, place, and time.   Skin: Skin is warm and dry.   Psychiatric: He has a normal mood and affect.   Nursing note and vitals reviewed.      Significant Labs:   CBC:   Recent Labs   Lab 05/25/20  0457 05/26/20  0656   WBC 21.34* 16.49*   HGB 7.9* 7.7*   HCT 27.1* 26.0*   * 367*     CMP:   Recent Labs   Lab 05/25/20  0457 05/26/20  0738   * 132*   K 4.3 4.4    97   CO2 24 22*   * 166*   BUN 27* 35*   CREATININE 1.5* 1.6*   CALCIUM 9.3 9.4   ANIONGAP 11 13   EGFRNONAA 50* 46*     All pertinent labs within the past 24 hours have been reviewed.    Significant Imaging:    Imaging Results          X-Ray Chest AP Portable (SOB) (Final result)  Result time 05/23/20 18:29:12    Final result by Mason Khan MD (05/23/20 18:29:12)                 Impression:      Interval development of peripheral consolidation involving the left midlung zone compatible with pneumonia.      Electronically signed by: Mason Khan MD  Date:    05/23/2020  Time:    18:29             Narrative:    EXAMINATION:  XR CHEST AP PORTABLE    CLINICAL HISTORY:  Cough.  Acute respiratory failure., SOB;    COMPARISON:  05/18/2020.    FINDINGS:  There has been  development of a peripheral left infiltrate.  Peripheral consolidation consistent with pneumonia.  The heart size is upper limit of normal.    Right-sided infiltrate appears improved.  Blunting of the costophrenic angle may represent small effusions.                                   I have reviewed all pertinent imaging results/findings within the past 24 hours.

## 2020-05-27 NOTE — ASSESSMENT & PLAN NOTE
-Moderate to severe by echo  -Contributing to SOB/constellation of symptoms  -Needs assessment via TRUDY. All risks, benefits, and treatment alternatives explained to patient in detail. All questions answered. He has agreed to proceed.  -NPO after MN

## 2020-05-27 NOTE — TELEPHONE ENCOUNTER
Received a call from pt stacy asking for info about pt care.  She is requesting a call from you or someone at the hospital.  I don't know if she is a responsible party or not.  She states she is a nurse practioner at the [facility].

## 2020-05-27 NOTE — PROGRESS NOTES
Pharmacy Consult Sign Off    Therapy with vancomycin is complete and/or consult has been discontinued by the provider.   Pharmacy will sign off. Please re-consult as needed.     Thank you for allowing us to participate in this patient's care.     Jadon Badillo PharmD 5/27/2020 11:06 AM

## 2020-05-28 NOTE — PROGRESS NOTES
[facility] -   Pulmonology  Progress Note    Patient Name: David Valera  MRN: [id number]  Admission Date: 5/23/2020  Hospital Length of Stay: 2 days  Code Status: Full Code  Attending Provider: Bandar John MD  Primary Care Provider: Rodrigo Ames MD   Principal Problem: Acute respiratory failure with hypoxia    Subjective: feels better     Interval History: episodes of cough syncope resolved after transfusion    Objective:     Vital Signs (Most Recent):  Temp: 97.9 °F (36.6 °C) (05/28/20 1639)  Pulse: 96 (05/28/20 1700)  Resp: 18 (05/28/20 1639)  BP: 107/75 (05/28/20 1639)  SpO2: (!) 94 % (05/28/20 1639) Vital Signs (24h Range):  Temp:  [96.9 °F (36.1 °C)-98.8 °F (37.1 °C)] 97.9 °F (36.6 °C)  Pulse:  [] 96  Resp:  [17-22] 18  SpO2:  [93 %-100 %] 94 %  BP: ()/(65-86) 107/75     Weight: 76.7 kg (169 lb)  Body mass index is 29.94 kg/m².      Intake/Output Summary (Last 24 hours) at 5/28/2020 1716  Last data filed at 5/28/2020 1600  Gross per 24 hour   Intake 1496.67 ml   Output 1225 ml   Net 271.67 ml       Physical Exam   Constitutional: He is oriented to person, place, and time. He appears toxic. He has a sickly appearance. He appears ill. No distress.   HENT:   Head: Normocephalic.   Eyes: Pupils are equal, round, and reactive to light.   Neck: Neck supple.   Cardiovascular: Regular rhythm. Tachycardia present.   Murmur heard.  Pulmonary/Chest: He has rales.   Abdominal: Soft.   Musculoskeletal: He exhibits no edema.   Neurological: He is alert and oriented to person, place, and time.   Skin: Skin is warm and dry.   Nursing note and vitals reviewed.      Vents:  Oxygen Concentration (%): 24 (05/28/20 0812)    Lines/Drains/Airways     Peripheral Intravenous Line                 Peripheral IV - Single Lumen 05/27/20 2300 20 G Right Antecubital less than 1 day                Significant Labs:    CBC/Anemia Profile:  Recent Labs   Lab 05/27/20  0532 05/28/20  0703   WBC 15.05* 15.47*    HGB 7.0* 10.4*   HCT 23.7* 33.8*   * 360*   MCV 73* 77*   RDW 18.6* 18.9*        Chemistries:  Recent Labs   Lab 05/27/20  0532 05/28/20  0703   * 134*   K 4.3 4.9   CL 98 100   CO2 22* 21*   BUN 46* 54*   CREATININE 1.8* 1.7*   CALCIUM 9.3 9.7   MG 2.3 2.5   PHOS 4.7* 5.8*       ABGs: No results for input(s): PH, PCO2, HCO3, POCSATURATED, BE in the last 48 hours.  Bilirubin:   Recent Labs   Lab 05/05/20  0446 05/18/20  1427 05/20/20  0501 05/21/20  0448 05/23/20  1711   BILITOT 0.4 0.9 0.7 0.5 0.5     Blood Culture: No results for input(s): LABBLOO in the last 48 hours.  BMP:   Recent Labs   Lab 05/28/20  0703   *   *   K 4.9      CO2 21*   BUN 54*   CREATININE 1.7*   CALCIUM 9.7   MG 2.5     All pertinent labs within the past 24 hours have been reviewed.    Significant Imaging:  Past Medical History:   Diagnosis Date    CHF (congestive heart failure)     Emphysema of lung        Past Surgical History:   Procedure Laterality Date    HIP FRACTURE SURGERY         Review of patient's allergies indicates:  No Known Allergies    Family History     None        Tobacco Use    Smoking status: Current Every Day Smoker     Packs/day: 1.00     Years: 25.00     Pack years: 25.00    Smokeless tobacco: Never Used   Substance and Sexual Activity    Alcohol use: Not Currently    Drug use: Not on file    Sexual activity: Not on file         Review of Systems   Constitutional: Positive for diaphoresis and fatigue.   HENT: Positive for congestion, postnasal drip and rhinorrhea.    Respiratory: Positive for cough, chest tightness, shortness of breath, wheezing and stridor.    Cardiovascular: Positive for palpitations and leg swelling.   Gastrointestinal: Negative.    Endocrine: Negative.    Genitourinary: Negative.    Musculoskeletal: Positive for arthralgias.   Skin: Negative.    Allergic/Immunologic: Negative.    Neurological: Positive for weakness.   Hematological: Negative.      Objective:      Vital Signs (Most Recent):  Temp: 97.9 °F (36.6 °C) (05/28/20 1639)  Pulse: 96 (05/28/20 1700)  Resp: 18 (05/28/20 1639)  BP: 107/75 (05/28/20 1639)  SpO2: (!) 94 % (05/28/20 1639) Vital Signs (24h Range):  Temp:  [96.9 °F (36.1 °C)-98.8 °F (37.1 °C)] 97.9 °F (36.6 °C)  Pulse:  [] 96  Resp:  [17-22] 18  SpO2:  [93 %-100 %] 94 %  BP: ()/(65-86) 107/75     Weight: 76.7 kg (169 lb)  Body mass index is 29.94 kg/m².      Intake/Output Summary (Last 24 hours) at 5/28/2020 1716  Last data filed at 5/28/2020 1600  Gross per 24 hour   Intake 1496.67 ml   Output 1225 ml   Net 271.67 ml       Physical Exam   Constitutional: He is oriented to person, place, and time. He appears well-developed and well-nourished.   HENT:   Head: Normocephalic and atraumatic.   Mouth/Throat: Oropharyngeal exudate present.   Eyes: Pupils are equal, round, and reactive to light. Conjunctivae are normal.   Neck: Neck supple. No JVD present. No tracheal deviation present. No thyromegaly present.   Cardiovascular: Normal rate, regular rhythm and normal heart sounds.   No murmur heard.  Pulmonary/Chest: Effort normal. He has decreased breath sounds. He has wheezes in the right lower field and the left lower field. He has no rhonchi. He has no rales.   Abdominal: Soft. Bowel sounds are normal.   Musculoskeletal: Normal range of motion. He exhibits no edema or tenderness.   Lymphadenopathy:     He has no cervical adenopathy.   Neurological: He is alert and oriented to person, place, and time.   Skin: Skin is warm and dry.   Nursing note and vitals reviewed.      Vents:  Oxygen Concentration (%): 24 (05/28/20 0812)    Lines/Drains/Airways     Peripheral Intravenous Line                 Peripheral IV - Single Lumen 05/27/20 2300 20 G Right Antecubital less than 1 day                Significant Labs:    CBC/Anemia Profile:  Recent Labs   Lab 05/27/20  0532 05/28/20  0703   WBC 15.05* 15.47*   HGB 7.0* 10.4*   HCT 23.7* 33.8*   * 360*    MCV 73* 77*   RDW 18.6* 18.9*        Chemistries:  Recent Labs   Lab 05/27/20  0532 05/28/20  0703   * 134*   K 4.3 4.9   CL 98 100   CO2 22* 21*   BUN 46* 54*   CREATININE 1.8* 1.7*   CALCIUM 9.3 9.7   MG 2.3 2.5   PHOS 4.7* 5.8*       ABGs:   No results for input(s): PH, PCO2, HCO3, POCSATURATED, BE in the last 48 hours.  BMP:   Recent Labs   Lab 05/28/20  0703   *   *   K 4.9      CO2 21*   BUN 54*   CREATININE 1.7*   CALCIUM 9.7   MG 2.5     CMP:   Recent Labs   Lab 05/27/20  0532 05/28/20  0703   * 134*   K 4.3 4.9   CL 98 100   CO2 22* 21*   * 173*   BUN 46* 54*   CREATININE 1.8* 1.7*   CALCIUM 9.3 9.7   ANIONGAP 12 13   EGFRNONAA 40* 43*     All pertinent labs within the past 24 hours have been reviewed.    Significant Imaging:   I have reviewed all pertinent imaging results/findings within the past 24 hours.  I have reviewed and interpreted all pertinent imaging results/findings within the past 24 hours.     X-Ray Chest AP Portable  Narrative: EXAMINATION:  XR CHEST AP PORTABLE    CLINICAL HISTORY:  shortness of breath;    TECHNIQUE:  Single frontal view of the chest was performed.    COMPARISON:  05/23/2020    FINDINGS:  Patchy infiltrates are seen throughout the mid and lower lung zones which is slightly progressed from prior exam.  Mild cardiomegaly.  Aorta demonstrates atherosclerotic disease.  No pleural effusion or pneumothorax.  Bones demonstrate mild degenerative changes in comparison to the prior study, there is no adverse interval changes.  Impression: Patchy infiltrates are seen throughout the mid and lower lung zones which is slightly progressed from prior exam.    Electronically signed by: Rodrigo Maddox MD  Date:    05/27/2020  Time:    13:38  I have reviewed all pertinent imaging results/findings within the past 24 hours.  I have reviewed and interpreted all pertinent imaging results/findings within the past 24 hours.      ABG  Recent Labs   Lab  05/23/20 2048   PH 7.471*   PO2 65*   PCO2 30.4*   HCO3 22.2*   BE -1     Assessment/Plan:     * Acute respiratory failure with hypoxia  5/25 bronchodilators, steroids, jet nebs  5/28 taper steroids, continue bronchodilators           Juice Rock MD  Pulmonology  [facility] -

## 2020-05-28 NOTE — ASSESSMENT & PLAN NOTE
-Moderate to severe by echo  -Contributing to SOB/constellation of symptoms  -Needs assessment via TRUDY. All risks, benefits, and treatment alternatives explained to patient in detail. All questions answered. He has agreed to proceed.  -NPO after MN    5/28/2020  -TRUDY tmw, further rec's to follow

## 2020-05-28 NOTE — HOSPITAL COURSE
5/28/2020-Patient seen and examined today, sitting up in chair. Feeling much better. SOB greatly improved post transfusion. On complaints of chest pain. BP borderline. TRUDY planned for AM to assess mitral valve.

## 2020-05-28 NOTE — SUBJECTIVE & OBJECTIVE
Interval History: episodes of cough syncope resolved after transfusion    Objective:     Vital Signs (Most Recent):  Temp: 97.9 °F (36.6 °C) (05/28/20 1639)  Pulse: 96 (05/28/20 1700)  Resp: 18 (05/28/20 1639)  BP: 107/75 (05/28/20 1639)  SpO2: (!) 94 % (05/28/20 1639) Vital Signs (24h Range):  Temp:  [96.9 °F (36.1 °C)-98.8 °F (37.1 °C)] 97.9 °F (36.6 °C)  Pulse:  [] 96  Resp:  [17-22] 18  SpO2:  [93 %-100 %] 94 %  BP: ()/(65-86) 107/75     Weight: 76.7 kg (169 lb)  Body mass index is 29.94 kg/m².      Intake/Output Summary (Last 24 hours) at 5/28/2020 1716  Last data filed at 5/28/2020 1600  Gross per 24 hour   Intake 1496.67 ml   Output 1225 ml   Net 271.67 ml       Physical Exam   Constitutional: He is oriented to person, place, and time. He appears toxic. He has a sickly appearance. He appears ill. No distress.   HENT:   Head: Normocephalic.   Eyes: Pupils are equal, round, and reactive to light.   Neck: Neck supple.   Cardiovascular: Regular rhythm. Tachycardia present.   Murmur heard.  Pulmonary/Chest: He has rales.   Abdominal: Soft.   Musculoskeletal: He exhibits no edema.   Neurological: He is alert and oriented to person, place, and time.   Skin: Skin is warm and dry.   Nursing note and vitals reviewed.      Vents:  Oxygen Concentration (%): 24 (05/28/20 0812)    Lines/Drains/Airways     Peripheral Intravenous Line                 Peripheral IV - Single Lumen 05/27/20 2300 20 G Right Antecubital less than 1 day                Significant Labs:    CBC/Anemia Profile:  Recent Labs   Lab 05/27/20  0532 05/28/20  0703   WBC 15.05* 15.47*   HGB 7.0* 10.4*   HCT 23.7* 33.8*   * 360*   MCV 73* 77*   RDW 18.6* 18.9*        Chemistries:  Recent Labs   Lab 05/27/20  0532 05/28/20  0703   * 134*   K 4.3 4.9   CL 98 100   CO2 22* 21*   BUN 46* 54*   CREATININE 1.8* 1.7*   CALCIUM 9.3 9.7   MG 2.3 2.5   PHOS 4.7* 5.8*       ABGs: No results for input(s): PH, PCO2, HCO3, POCSATURATED, BE in the  last 48 hours.  Bilirubin:   Recent Labs   Lab 05/05/20  0446 05/18/20  1427 05/20/20  0501 05/21/20  0448 05/23/20  1711   BILITOT 0.4 0.9 0.7 0.5 0.5     Blood Culture: No results for input(s): LABBLOO in the last 48 hours.  BMP:   Recent Labs   Lab 05/28/20  0703   *   *   K 4.9      CO2 21*   BUN 54*   CREATININE 1.7*   CALCIUM 9.7   MG 2.5     All pertinent labs within the past 24 hours have been reviewed.    Significant Imaging:  Past Medical History:   Diagnosis Date    CHF (congestive heart failure)     Emphysema of lung        Past Surgical History:   Procedure Laterality Date    HIP FRACTURE SURGERY         Review of patient's allergies indicates:  No Known Allergies    Family History     None        Tobacco Use    Smoking status: Current Every Day Smoker     Packs/day: 1.00     Years: 25.00     Pack years: 25.00    Smokeless tobacco: Never Used   Substance and Sexual Activity    Alcohol use: Not Currently    Drug use: Not on file    Sexual activity: Not on file         Review of Systems   Constitutional: Positive for diaphoresis and fatigue.   HENT: Positive for congestion, postnasal drip and rhinorrhea.    Respiratory: Positive for cough, chest tightness, shortness of breath, wheezing and stridor.    Cardiovascular: Positive for palpitations and leg swelling.   Gastrointestinal: Negative.    Endocrine: Negative.    Genitourinary: Negative.    Musculoskeletal: Positive for arthralgias.   Skin: Negative.    Allergic/Immunologic: Negative.    Neurological: Positive for weakness.   Hematological: Negative.      Objective:     Vital Signs (Most Recent):  Temp: 97.9 °F (36.6 °C) (05/28/20 1639)  Pulse: 96 (05/28/20 1700)  Resp: 18 (05/28/20 1639)  BP: 107/75 (05/28/20 1639)  SpO2: (!) 94 % (05/28/20 1639) Vital Signs (24h Range):  Temp:  [96.9 °F (36.1 °C)-98.8 °F (37.1 °C)] 97.9 °F (36.6 °C)  Pulse:  [] 96  Resp:  [17-22] 18  SpO2:  [93 %-100 %] 94 %  BP: ()/(65-86)  107/75     Weight: 76.7 kg (169 lb)  Body mass index is 29.94 kg/m².      Intake/Output Summary (Last 24 hours) at 5/28/2020 1716  Last data filed at 5/28/2020 1600  Gross per 24 hour   Intake 1496.67 ml   Output 1225 ml   Net 271.67 ml       Physical Exam   Constitutional: He is oriented to person, place, and time. He appears well-developed and well-nourished.   HENT:   Head: Normocephalic and atraumatic.   Mouth/Throat: Oropharyngeal exudate present.   Eyes: Pupils are equal, round, and reactive to light. Conjunctivae are normal.   Neck: Neck supple. No JVD present. No tracheal deviation present. No thyromegaly present.   Cardiovascular: Normal rate, regular rhythm and normal heart sounds.   No murmur heard.  Pulmonary/Chest: Effort normal. He has decreased breath sounds. He has wheezes in the right lower field and the left lower field. He has no rhonchi. He has no rales.   Abdominal: Soft. Bowel sounds are normal.   Musculoskeletal: Normal range of motion. He exhibits no edema or tenderness.   Lymphadenopathy:     He has no cervical adenopathy.   Neurological: He is alert and oriented to person, place, and time.   Skin: Skin is warm and dry.   Nursing note and vitals reviewed.      Vents:  Oxygen Concentration (%): 24 (05/28/20 0812)    Lines/Drains/Airways     Peripheral Intravenous Line                 Peripheral IV - Single Lumen 05/27/20 2300 20 G Right Antecubital less than 1 day                Significant Labs:    CBC/Anemia Profile:  Recent Labs   Lab 05/27/20  0532 05/28/20  0703   WBC 15.05* 15.47*   HGB 7.0* 10.4*   HCT 23.7* 33.8*   * 360*   MCV 73* 77*   RDW 18.6* 18.9*        Chemistries:  Recent Labs   Lab 05/27/20  0532 05/28/20  0703   * 134*   K 4.3 4.9   CL 98 100   CO2 22* 21*   BUN 46* 54*   CREATININE 1.8* 1.7*   CALCIUM 9.3 9.7   MG 2.3 2.5   PHOS 4.7* 5.8*       ABGs:   No results for input(s): PH, PCO2, HCO3, POCSATURATED, BE in the last 48 hours.  BMP:   Recent Labs   Lab  05/28/20  0703   *   *   K 4.9      CO2 21*   BUN 54*   CREATININE 1.7*   CALCIUM 9.7   MG 2.5     CMP:   Recent Labs   Lab 05/27/20  0532 05/28/20  0703   * 134*   K 4.3 4.9   CL 98 100   CO2 22* 21*   * 173*   BUN 46* 54*   CREATININE 1.8* 1.7*   CALCIUM 9.3 9.7   ANIONGAP 12 13   EGFRNONAA 40* 43*     All pertinent labs within the past 24 hours have been reviewed.    Significant Imaging:   I have reviewed all pertinent imaging results/findings within the past 24 hours.  I have reviewed and interpreted all pertinent imaging results/findings within the past 24 hours.     X-Ray Chest AP Portable  Narrative: EXAMINATION:  XR CHEST AP PORTABLE    CLINICAL HISTORY:  shortness of breath;    TECHNIQUE:  Single frontal view of the chest was performed.    COMPARISON:  05/23/2020    FINDINGS:  Patchy infiltrates are seen throughout the mid and lower lung zones which is slightly progressed from prior exam.  Mild cardiomegaly.  Aorta demonstrates atherosclerotic disease.  No pleural effusion or pneumothorax.  Bones demonstrate mild degenerative changes in comparison to the prior study, there is no adverse interval changes.  Impression: Patchy infiltrates are seen throughout the mid and lower lung zones which is slightly progressed from prior exam.    Electronically signed by: Rodrigo Maddox MD  Date:    05/27/2020  Time:    13:38  I have reviewed all pertinent imaging results/findings within the past 24 hours.  I have reviewed and interpreted all pertinent imaging results/findings within the past 24 hours.

## 2020-05-28 NOTE — ASSESSMENT & PLAN NOTE
-H/H 7.0/23.7  -Recommend transfusion, discussed with hospital medicine  -Isaiah in between units    5/28/2020  -Improved post transfusion  -Mgmt as per primary team

## 2020-05-28 NOTE — PLAN OF CARE
Plan of care reviewed with patient. Patient verbalized understanding.   Pt NPO since midnight for procedure today.  Received 2 units RBCs last night for symptomatic anemia. Tolerated well.  IV Lasix scheduled between units held due to low BP (per provider). Order changed to PRN if SBP > 120.   Complained of nausea last night. Given Zofran PO.   Bed remains low and locked, side rails up x 2, call bell and belongings within reach.   Bedside commode/urinal in room.    Frequent repositioning encouraged to prevent pressure injury.    PIV intact. Cardiac monitor in place.  No c/o or questions at this time.   Will continue to monitor.

## 2020-05-28 NOTE — PROGRESS NOTES
[facility] Hospital Medicine  Progress Note    Patient Name: David Valera  MRN: [id number]  Patient Class: IP- Inpatient   Admission Date: 5/23/2020  Length of Stay: 1 days  Attending Physician: Bandar John MD  Primary Care Provider: Rodrigo Ames MD        Subjective:     Principal Problem:Acute respiratory failure with hypoxia        HPI:  David Valera is a 60 y.o. AAM with PMH of COPD, CKD, HTN who is presenting as c/o SOB. He states he is a  and drives often long distances. He was recently admitted to the hospital for similar complaints however he had hemoptysis along with SOB. TB and PE were ruled out at that time.treated with levaquin and discharge yesterday but he continue to have shortness of breath . In ed patient was found to have leukocytosis . cxr shows left mid lung consolidation .started on vanco /cefepime respiratiory cultures . Patient was also given dose of lasix 60 mg in ed      Overview/Hospital Course:  5/24/20:  Pt continues to be sob, which he reports started 5 weeks ago and has led to 3 admissions in [facility], where he was r/o for PE and TB and he was last DC'd home on levofloxacin, but continued to have worsening sob, cough and LE edema.   - Encouraged to quit smoking completely and to avoid all foods or drinks with >190 mg sodium/serving.    5/25/20:  Pt continue to have sob, particularly with minimal ambulation. Discussed healthy lifestyle modifications in detail with pt and his niece, who is a nurse. Will restart gentle diuresis given pitting LE edema and elevated BNP to 760  - Pulm consulted given his severe pulmonary HTN (and severe LAE & BLAYNE) RE possible sildenafil and/or other suggestions   - Continue Vanc & Cefepime for now  - Continue to encourage to avoid all foods or drinks with >190 mg sodium/serving and to with >9 carbohydrate / fiber ratio.    5/26/20:  Patient fell last night while up to restroom and hit his head; Head CT  unremarkable; no residual pain or deficits since. No further hemoptasis.   - F/U bronchoscopy results once complete  - Consider sildenafil as outpatient  - Continue Vanc/Cefepime    5/27/20:  No events. Patient seen by cardiology, who recommended 2U PRBCs w/ furosemide in between to improve oxygenation.  - F/U further cards recs    Interval History: No events. Cough, sob and fatigue persists    Review of Systems   Constitutional: Negative for activity change and appetite change.   HENT: Negative for congestion, rhinorrhea and sore throat.    Eyes: Negative for discharge and redness.   Respiratory: Positive for cough (assoc with hemoptasis) and shortness of breath. Negative for chest tightness and wheezing.    Cardiovascular: Negative for chest pain and leg swelling.   Gastrointestinal: Negative for abdominal pain, diarrhea and vomiting.   Endocrine: Negative for cold intolerance, heat intolerance and polyuria.   Genitourinary: Negative for difficulty urinating, dysuria, flank pain, hematuria and urgency.   Musculoskeletal: Negative for arthralgias, back pain and myalgias.   Skin: Negative for color change and pallor.   Allergic/Immunologic: Negative for immunocompromised state.   Neurological: Negative for dizziness, light-headedness and headaches.   Hematological: Negative for adenopathy.   Psychiatric/Behavioral: Negative for agitation and confusion. The patient is not nervous/anxious.      Objective:     Vital Signs (Most Recent):  Temp: 97.8 °F (36.6 °C) (05/27/20 1600)  Pulse: 95 (05/27/20 1600)  Resp: 16 (05/27/20 1600)  BP: 108/75 (05/27/20 1600)  SpO2: 97 % (05/27/20 1600) Vital Signs (24h Range):  Temp:  [97.7 °F (36.5 °C)-98.4 °F (36.9 °C)] 97.8 °F (36.6 °C)  Pulse:  [] 95  Resp:  [16-30] 16  SpO2:  [96 %-100 %] 97 %  BP: ()/(58-75) 108/75     Weight: 76.7 kg (169 lb)  Body mass index is 29.94 kg/m².    Intake/Output Summary (Last 24 hours) at 5/27/2020 1901  Last data filed at 5/27/2020  1739  Gross per 24 hour   Intake 250 ml   Output 725 ml   Net -475 ml      Physical Exam   Constitutional: He is oriented to person, place, and time. He appears well-developed and well-nourished.   HENT:   Head: Normocephalic and atraumatic.   Eyes: Pupils are equal, round, and reactive to light. Conjunctivae are normal. Right eye exhibits no discharge. Left eye exhibits no discharge. No scleral icterus.   Neck: Neck supple. No JVD present. No tracheal deviation present. No thyromegaly present.   Cardiovascular: Normal rate, regular rhythm and normal heart sounds.   No murmur heard.  Pulmonary/Chest: Effort normal. No respiratory distress. He has decreased breath sounds. He has wheezes in the right lower field and the left lower field. He has no rhonchi. He has no rales.   Abdominal: Soft. Bowel sounds are normal. He exhibits no distension.   Musculoskeletal: Normal range of motion. He exhibits no edema or tenderness.   Lymphadenopathy:     He has no cervical adenopathy.   Neurological: He is alert and oriented to person, place, and time.   Skin: Skin is warm and dry.   Psychiatric: He has a normal mood and affect.   Nursing note and vitals reviewed.      Significant Labs:   CBC:   Recent Labs   Lab 05/26/20  0656 05/27/20  0532   WBC 16.49* 15.05*   HGB 7.7* 7.0*   HCT 26.0* 23.7*   * 368*     CMP:   Recent Labs   Lab 05/26/20  0738 05/27/20  0532   * 132*   K 4.4 4.3   CL 97 98   CO2 22* 22*   * 158*   BUN 35* 46*   CREATININE 1.6* 1.8*   CALCIUM 9.4 9.3   ANIONGAP 13 12   EGFRNONAA 46* 40*     All pertinent labs within the past 24 hours have been reviewed.    Significant Imaging:    Imaging Results          X-Ray Chest AP Portable (SOB) (Final result)  Result time 05/23/20 18:29:12    Final result by Mason Khan MD (05/23/20 18:29:12)                 Impression:      Interval development of peripheral consolidation involving the left midlung zone compatible with  pneumonia.      Electronically signed by: Mason Khan MD  Date:    05/23/2020  Time:    18:29             Narrative:    EXAMINATION:  XR CHEST AP PORTABLE    CLINICAL HISTORY:  Cough.  Acute respiratory failure., SOB;    COMPARISON:  05/18/2020.    FINDINGS:  There has been development of a peripheral left infiltrate.  Peripheral consolidation consistent with pneumonia.  The heart size is upper limit of normal.    Right-sided infiltrate appears improved.  Blunting of the costophrenic angle may represent small effusions.                                   I have reviewed all pertinent imaging results/findings within the past 24 hours.      Assessment/Plan:      * Acute respiratory failure with hypoxia  - Pneumonia started broad spectrum Vanc & Cefepime  - po2 66   - 4L on nasal canula oxygen   - May also be partially due to right-sided CHF from severe pulmonary HTN  - F/U pulmonary recs      acute on chronic ckd 3  Avoid nephrotoxic drugs       Acute renal failure superimposed on stage 3 chronic kidney disease  Avoids nephrotoxic drugs   Will continue monitor cr       Pneumonia  -non resolving pneumonia failed levaquin   -started vanc/cefepime on 5/24/20  -resp culture blood culture   -procal,nasal mrsa   -cxr shows left sided consolidation  -Pulm consulted due to severe pulmonary HTN and for any further recs given patient's multiple hosp admissions w/ hypoxia, who plans to check bronchoscopy prior to DC    Acute on chronic congestive heart failure  Right sided 2/2 COPD (pulmonary HTN) 2/2 smoking and BERTHA; LE edema improved following Lasix 20 mg yesterday, but this may have contributed to his fall given his low BPs in the 90's/60's and pre-load dependant physiology.  - Consider sildenafil  - Hold furosemide        BERTHA on CPAP  - cpap at night       COPD exacerbation  Copd pathway   Steroids   Breathing treatments   - Encouraging to quit smoking completely, which he started at age of 17 years, 1 PPD until age 60  years    Pulmonary hypertension  Will consider sildenafil      Symptomatic anemia  Will consider TFx if Hgb drops <7        VTE Risk Mitigation (From admission, onward)         Ordered     IP VTE HIGH RISK PATIENT  Once      05/23/20 1942     Place sequential compression device  Until discontinued      05/23/20 1942                      Bandar John MD  Department of Hospital Medicine   [facility]

## 2020-05-28 NOTE — PLAN OF CARE
Patient AAOx4. VSS.  Patient remained afebrile throughout the shift.  Heart rate closely monitored   Patient NSR on monitor.  Patient remained free of falls this shift.  Plan of care reviewed.  Patient verbalized understanding.  Patient moving/turning independently and with assistance  Frequent weight shifting encouraged.  Bed low, siderails up x2, wheels locked, call light in reach.  Patient instructed to call for assistance.  Hourly rounding completed.  Will continue to monitor.

## 2020-05-28 NOTE — SUBJECTIVE & OBJECTIVE
Interval History: No events. Cough, sob and fatigue persists    Review of Systems   Constitutional: Negative for activity change and appetite change.   HENT: Negative for congestion, rhinorrhea and sore throat.    Eyes: Negative for discharge and redness.   Respiratory: Positive for cough (assoc with hemoptasis) and shortness of breath. Negative for chest tightness and wheezing.    Cardiovascular: Negative for chest pain and leg swelling.   Gastrointestinal: Negative for abdominal pain, diarrhea and vomiting.   Endocrine: Negative for cold intolerance, heat intolerance and polyuria.   Genitourinary: Negative for difficulty urinating, dysuria, flank pain, hematuria and urgency.   Musculoskeletal: Negative for arthralgias, back pain and myalgias.   Skin: Negative for color change and pallor.   Allergic/Immunologic: Negative for immunocompromised state.   Neurological: Negative for dizziness, light-headedness and headaches.   Hematological: Negative for adenopathy.   Psychiatric/Behavioral: Negative for agitation and confusion. The patient is not nervous/anxious.      Objective:     Vital Signs (Most Recent):  Temp: 97.8 °F (36.6 °C) (05/27/20 1600)  Pulse: 95 (05/27/20 1600)  Resp: 16 (05/27/20 1600)  BP: 108/75 (05/27/20 1600)  SpO2: 97 % (05/27/20 1600) Vital Signs (24h Range):  Temp:  [97.7 °F (36.5 °C)-98.4 °F (36.9 °C)] 97.8 °F (36.6 °C)  Pulse:  [] 95  Resp:  [16-30] 16  SpO2:  [96 %-100 %] 97 %  BP: ()/(58-75) 108/75     Weight: 76.7 kg (169 lb)  Body mass index is 29.94 kg/m².    Intake/Output Summary (Last 24 hours) at 5/27/2020 1901  Last data filed at 5/27/2020 1739  Gross per 24 hour   Intake 250 ml   Output 725 ml   Net -475 ml      Physical Exam   Constitutional: He is oriented to person, place, and time. He appears well-developed and well-nourished.   HENT:   Head: Normocephalic and atraumatic.   Eyes: Pupils are equal, round, and reactive to light. Conjunctivae are normal. Right eye  exhibits no discharge. Left eye exhibits no discharge. No scleral icterus.   Neck: Neck supple. No JVD present. No tracheal deviation present. No thyromegaly present.   Cardiovascular: Normal rate, regular rhythm and normal heart sounds.   No murmur heard.  Pulmonary/Chest: Effort normal. No respiratory distress. He has decreased breath sounds. He has wheezes in the right lower field and the left lower field. He has no rhonchi. He has no rales.   Abdominal: Soft. Bowel sounds are normal. He exhibits no distension.   Musculoskeletal: Normal range of motion. He exhibits no edema or tenderness.   Lymphadenopathy:     He has no cervical adenopathy.   Neurological: He is alert and oriented to person, place, and time.   Skin: Skin is warm and dry.   Psychiatric: He has a normal mood and affect.   Nursing note and vitals reviewed.      Significant Labs:   CBC:   Recent Labs   Lab 05/26/20  0656 05/27/20  0532   WBC 16.49* 15.05*   HGB 7.7* 7.0*   HCT 26.0* 23.7*   * 368*     CMP:   Recent Labs   Lab 05/26/20  0738 05/27/20  0532   * 132*   K 4.4 4.3   CL 97 98   CO2 22* 22*   * 158*   BUN 35* 46*   CREATININE 1.6* 1.8*   CALCIUM 9.4 9.3   ANIONGAP 13 12   EGFRNONAA 46* 40*     All pertinent labs within the past 24 hours have been reviewed.    Significant Imaging:    Imaging Results          X-Ray Chest AP Portable (SOB) (Final result)  Result time 05/23/20 18:29:12    Final result by Mason Khan MD (05/23/20 18:29:12)                 Impression:      Interval development of peripheral consolidation involving the left midlung zone compatible with pneumonia.      Electronically signed by: Mason Khan MD  Date:    05/23/2020  Time:    18:29             Narrative:    EXAMINATION:  XR CHEST AP PORTABLE    CLINICAL HISTORY:  Cough.  Acute respiratory failure., SOB;    COMPARISON:  05/18/2020.    FINDINGS:  There has been development of a peripheral left infiltrate.  Peripheral consolidation  consistent with pneumonia.  The heart size is upper limit of normal.    Right-sided infiltrate appears improved.  Blunting of the costophrenic angle may represent small effusions.                                   I have reviewed all pertinent imaging results/findings within the past 24 hours.

## 2020-05-28 NOTE — ASSESSMENT & PLAN NOTE
-Multifactorial-secondary to COPD, severe pulmonary HTN, PNA, right-sided CHF, mitral stenosis,. anemia  -Continue nebs/abx/steroids  -Diurese in between transfusion  -Needs further assessment of mitral valve with TRUDY  -NPO after MN    5/28/2020  -Stable, improved post transfusion  -TRUDY tmw at 7:30 AM to reassess mitral valve. All risks, benefits, and treatment alternatives explained to patient in detail. All questions answered. He has agreed to proceed.

## 2020-05-28 NOTE — ASSESSMENT & PLAN NOTE
-Patient has element of right sided-heart failure  -Recommend diuresing in between units of blood  -Will further optimize meds as BP permits  -Echo reviewed, showed normal EF, moderately reduced RV function, severe pulmonary HTN    5/28/2020  -Stable overnight, BP borderline at times  -Will adjust meds accordingly pending BP trend

## 2020-05-28 NOTE — PROGRESS NOTES
[facility]  Cardiology  Progress Note    Patient Name: David Valera  MRN: [id number]  Admission Date: 5/23/2020  Hospital Length of Stay: 2 days  Code Status: Full Code   Attending Physician: Bandar John MD   Primary Care Physician: Rodrigo Ames MD  Expected Discharge Date:   Principal Problem:Acute respiratory failure with hypoxia    Subjective:   HPI:  Mr. Valera is a 60 year old male patient whose current medical conditions include COPD, CKD, and pulmonary HTN who presented to [facility] ED on 5/23/2020 with a chief complaint of worsening SOB. Associated symptoms included hemoptysis and fatigue. Patient denied any associated fever, chills, palpitations, near syncope, or syncope. Reported he had recently been discharged from [facility] due to similar symptoms. Initial workup in ED revealed leukocytosis and CXR showed left mid lung infiltrate and patient was subsequently admitted for further evaluation and treatment. This afternoon, patient complained of worsening SOB along with some chest pressure and cardiology was consulted to assist with management. Patient seen and examined today, sitting up in bed. Still feels poorly. Remains SOB. Endorses some left-sided pressure like discomfort that onset this AM after eating. Denies any history of CAD/MI. He reports compliance with his medications. States he has been admitted to hospital four times with similar symptoms.Chart reviewed. Troponin 0.031. EKG shows non-specific T wave abnormality, no acute ischemic changes. Creatinine 1.8. BNP 1500. H/H 7.0/23.7. Echo showed normal EF, moderately reduced RV function, severe pulmonary HTN (PA pressure 110 mmHg), mod-severe MS.    Hospital Course:   5/28/2020-Patient seen and examined today, sitting up in chair. Feeling much better. SOB greatly improved post transfusion. On complaints of chest pain. BP borderline. TRUDY planned for AM to assess mitral valve.        Review of Systems   Constitution:  Positive for malaise/fatigue.   HENT: Negative.    Eyes: Negative.    Cardiovascular: Positive for dyspnea on exertion (improved).   Respiratory: Positive for shortness of breath (improved).    Endocrine: Negative.    Hematologic/Lymphatic: Negative.    Skin: Negative.    Musculoskeletal: Negative.    Gastrointestinal: Negative.    Genitourinary: Negative.    Neurological: Negative.    Psychiatric/Behavioral: Negative.    Allergic/Immunologic: Negative.      Objective:     Vital Signs (Most Recent):  Temp: 98.4 °F (36.9 °C) (05/28/20 1151)  Pulse: 90 (05/28/20 1300)  Resp: 18 (05/28/20 1151)  BP: 106/71 (05/28/20 1151)  SpO2: 98 % (05/28/20 1151) Vital Signs (24h Range):  Temp:  [96.9 °F (36.1 °C)-98.8 °F (37.1 °C)] 98.4 °F (36.9 °C)  Pulse:  [] 90  Resp:  [16-22] 18  SpO2:  [93 %-100 %] 98 %  BP: ()/(65-86) 106/71     Weight: 76.7 kg (169 lb)  Body mass index is 29.94 kg/m².     SpO2: 98 %  O2 Device (Oxygen Therapy): nasal cannula      Intake/Output Summary (Last 24 hours) at 5/28/2020 1347  Last data filed at 5/28/2020 0925  Gross per 24 hour   Intake 911.67 ml   Output 800 ml   Net 111.67 ml       Lines/Drains/Airways     Peripheral Intravenous Line                 Peripheral IV - Single Lumen 05/27/20 2300 20 G Right Antecubital less than 1 day                Physical Exam   Constitutional: He is oriented to person, place, and time. He appears well-developed and well-nourished. No distress.   HENT:   Head: Normocephalic and atraumatic.   Eyes: Pupils are equal, round, and reactive to light. Right eye exhibits no discharge. Left eye exhibits no discharge.   Neck: Neck supple. No JVD present.   Cardiovascular: Normal rate, regular rhythm, S1 normal and S2 normal.   Murmur heard.   Low-pitched rumbling crescendo presystolic murmur is present at the apex.  Pulmonary/Chest: Effort normal and breath sounds normal. No respiratory distress. He has no wheezes.   Diminished BS at bases   Abdominal: Soft. He  exhibits no distension.   Musculoskeletal: He exhibits no edema.   Neurological: He is alert and oriented to person, place, and time.   Skin: Skin is warm and dry. He is not diaphoretic. No erythema.   Psychiatric: He has a normal mood and affect. His behavior is normal. Thought content normal.   Nursing note and vitals reviewed.      Significant Labs:   CMP   Recent Labs   Lab 05/27/20  0532 05/28/20  0703   * 134*   K 4.3 4.9   CL 98 100   CO2 22* 21*   * 173*   BUN 46* 54*   CREATININE 1.8* 1.7*   CALCIUM 9.3 9.7   ANIONGAP 12 13   ESTGFRAFRICA 46* 50*   EGFRNONAA 40* 43*   , CBC   Recent Labs   Lab 05/27/20  0532 05/28/20  0703   WBC 15.05* 15.47*   HGB 7.0* 10.4*   HCT 23.7* 33.8*   * 360*   , Troponin   Recent Labs   Lab 05/27/20  1318 05/27/20  1833   TROPONINI 0.031* 0.029*    and All pertinent lab results from the last 24 hours have been reviewed.    Significant Imaging: Echocardiogram: 2D echo with color flow doppler: No results found for this or any previous visit., EKG: REviewed and X-Ray: CXR: X-Ray Chest 1 View (CXR): No results found for this visit on 05/23/20. and X-Ray Chest PA and Lateral (CXR): No results found for this visit on 05/23/20.    Assessment and Plan:   Patient who presents with SOB-multifactorial, improved post transfusion. TRUDY tmw to assess mitral valve. Further rec's to follow.    * Acute respiratory failure with hypoxia  -Multifactorial-secondary to COPD, severe pulmonary HTN, PNA, right-sided CHF, mitral stenosis,. anemia  -Continue nebs/abx/steroids  -Diurese in between transfusion  -Needs further assessment of mitral valve with TRUDY  -NPO after MN    5/28/2020  -Stable, improved post transfusion  -TRUDY tmw at 7:30 AM to reassess mitral valve. All risks, benefits, and treatment alternatives explained to patient in detail. All questions answered. He has agreed to proceed.    Nonrheumatic mitral valve stenosis  -Moderate to severe by echo  -Contributing to  SOB/constellation of symptoms  -Needs assessment via TRUDY. All risks, benefits, and treatment alternatives explained to patient in detail. All questions answered. He has agreed to proceed.  -NPO after MN    5/28/2020  -TRUDY tmw, further rec's to follow    Acute renal failure superimposed on stage 3 chronic kidney disease  -Creatinine 1.7  -Monitor    Pneumonia  -Mgmt as per primary team  -On abx    Acute on chronic congestive heart failure  -Patient has element of right sided-heart failure  -Recommend diuresing in between units of blood  -Will further optimize meds as BP permits  -Echo reviewed, showed normal EF, moderately reduced RV function, severe pulmonary HTN    5/28/2020  -Stable overnight, BP borderline at times  -Will adjust meds accordingly pending BP trend    BERTHA on CPAP  -Continue CPAP usage    Hemoptysis  -Mgmt as per pulmonary/primary team    COPD exacerbation  -Mgmt as per pulmonary    Pulmonary hypertension  -Pulmonary on board  -Secondary to COPD/valvular heart disease    Symptomatic anemia  -H/H 7.0/23.7  -Recommend transfusion, discussed with hospital medicine  -Diurese in between units    5/28/2020  -Improved post transfusion  -Mgmt as per primary team        VTE Risk Mitigation (From admission, onward)         Ordered     IP VTE HIGH RISK PATIENT  Once      05/23/20 1942     Place sequential compression device  Until discontinued      05/23/20 1942                Rosalba Melvin PA-C  Cardiology  [facility]

## 2020-05-29 NOTE — ASSESSMENT & PLAN NOTE
Will consider TFx if Hgb drops <7  5/28/20: s/p 2U PRBCs, which has improved oxygenation and decreased sob/fatigue significantly  - Continue to monitor closely

## 2020-05-29 NOTE — PROGRESS NOTES
[facility] Hospital Medicine  Progress Note    Patient Name: David Valera  MRN: [id number]  Patient Class: IP- Inpatient   Admission Date: 5/23/2020  Length of Stay: 2 days  Attending Physician: Bandar John MD  Primary Care Provider: Rodrigo Ames MD        Subjective:     Principal Problem:Acute respiratory failure with hypoxia        HPI:  David Valera is a 60 y.o. AAM with PMH of COPD, CKD, HTN who is presenting as c/o SOB. He states he is a  and drives often long distances. He was recently admitted to the hospital for similar complaints however he had hemoptysis along with SOB. TB and PE were ruled out at that time.treated with levaquin and discharge yesterday but he continue to have shortness of breath . In ed patient was found to have leukocytosis . cxr shows left mid lung consolidation .started on vanco /cefepime respiratiory cultures . Patient was also given dose of lasix 60 mg in ed      Overview/Hospital Course:  5/24/20:  Pt continues to be sob, which he reports started 5 weeks ago and has led to 3 admissions in [facility], where he was r/o for PE and TB and he was last DC'd home on levofloxacin, but continued to have worsening sob, cough and LE edema.   - Encouraged to quit smoking completely and to avoid all foods or drinks with >190 mg sodium/serving.    5/25/20:  Pt continue to have sob, particularly with minimal ambulation. Discussed healthy lifestyle modifications in detail with pt and his niece, who is a nurse. Will restart gentle diuresis given pitting LE edema and elevated BNP to 760  - Pulm consulted given his severe pulmonary HTN (and severe LAE & BLAYNE) RE possible sildenafil and/or other suggestions   - Continue Vanc & Cefepime for now  - Continue to encourage to avoid all foods or drinks with >190 mg sodium/serving and to with >9 carbohydrate / fiber ratio.    5/26/20:  Patient fell last night while up to restroom and hit his head; Head CT  unremarkable; no residual pain or deficits since. No further hemoptasis.   - F/U bronchoscopy results once complete  - Consider sildenafil as outpatient  - Continue Vanc/Cefepime    5/27/20:  No events. Patient seen by cardiology, who recommended 2U PRBCs w/ furosemide in between to improve oxygenation.  - F/U further cards recs  5/28/20: No events overnights. Reports much improvement in fatigue and sob since receiving 2U PRBCs.   - NPO after MN for TRUDY in AM for further evaluation of mitral valve    Interval History: No events overnights. He reports a significant improvement in his SOB and energy level.    Review of Systems   Constitutional: Negative for activity change and appetite change.   HENT: Negative for congestion, rhinorrhea and sore throat.    Eyes: Negative for discharge and redness.   Respiratory: Positive for cough (assoc with hemoptasis). Negative for chest tightness, shortness of breath and wheezing.    Cardiovascular: Negative for chest pain and leg swelling.   Gastrointestinal: Negative for abdominal pain, diarrhea and vomiting.   Endocrine: Negative for cold intolerance, heat intolerance and polyuria.   Genitourinary: Negative for difficulty urinating, dysuria, flank pain, hematuria and urgency.   Musculoskeletal: Negative for arthralgias, back pain and myalgias.   Skin: Negative for color change and pallor.   Allergic/Immunologic: Negative for immunocompromised state.   Neurological: Negative for dizziness, light-headedness and headaches.   Hematological: Negative for adenopathy.   Psychiatric/Behavioral: Negative for agitation and confusion. The patient is not nervous/anxious.      Objective:     Vital Signs (Most Recent):  Temp: 97.9 °F (36.6 °C) (05/28/20 2303)  Pulse: 91 (05/28/20 2303)  Resp: 18 (05/28/20 2303)  BP: 100/65 (05/28/20 2303)  SpO2: (!) 93 % (05/28/20 2303) Vital Signs (24h Range):  Temp:  [97.5 °F (36.4 °C)-98.8 °F (37.1 °C)] 97.9 °F (36.6 °C)  Pulse:  [] 91  Resp:   [17-24] 18  SpO2:  [93 %-100 %] 93 %  BP: ()/(65-78) 100/65     Weight: 76.7 kg (169 lb)  Body mass index is 29.94 kg/m².    Intake/Output Summary (Last 24 hours) at 5/28/2020 2316  Last data filed at 5/28/2020 1600  Gross per 24 hour   Intake 1336.67 ml   Output 625 ml   Net 711.67 ml      Physical Exam   Constitutional: He is oriented to person, place, and time. He appears toxic. He has a sickly appearance. He appears ill. No distress.   HENT:   Head: Normocephalic.   Eyes: Pupils are equal, round, and reactive to light.   Neck: Neck supple.   Cardiovascular: Regular rhythm. Tachycardia present.   Murmur heard.  Pulmonary/Chest: He has rales.   Abdominal: Soft.   Musculoskeletal: He exhibits no edema.   Neurological: He is alert and oriented to person, place, and time.   Skin: Skin is warm and dry.   Nursing note and vitals reviewed.      Significant Labs:   CBC:   Recent Labs   Lab 05/27/20  0532 05/28/20  0703   WBC 15.05* 15.47*   HGB 7.0* 10.4*   HCT 23.7* 33.8*   * 360*     CMP:   Recent Labs   Lab 05/27/20  0532 05/28/20  0703   * 134*   K 4.3 4.9   CL 98 100   CO2 22* 21*   * 173*   BUN 46* 54*   CREATININE 1.8* 1.7*   CALCIUM 9.3 9.7   ANIONGAP 12 13   EGFRNONAA 40* 43*     All pertinent labs within the past 24 hours have been reviewed.    Significant Imaging: I have reviewed all pertinent imaging results/findings within the past 24 hours.      Assessment/Plan:      * Acute respiratory failure with hypoxia  - Pneumonia started broad spectrum Vanc & Cefepime  - po2 66   - 4L on nasal canula oxygen   - May also be partially due to right-sided CHF from severe pulmonary HTN  - F/U pulmonary recs  5/28/20: NC weaned off with sats in 92-94% range on RA since 2U PRBCs.   - NPO after MN for TRUDY in AM for further evaluation of his mitral valve    Cough syncope syndrome  Resolved since having 2U PRBCs      Nonrheumatic mitral valve stenosis  5/28/20: Will evaluate further with TRUDY in  am      acute on chronic ckd 3  Avoid nephrotoxic drugs       Acute renal failure superimposed on stage 3 chronic kidney disease  Avoids nephrotoxic drugs   Will continue monitor cr       Pneumonia  -non resolving pneumonia failed levaquin   -started vanc/cefepime on 5/24/20  -resp culture blood culture   -procal,nasal mrsa   -cxr shows left sided consolidation  -Pulm consulted due to severe pulmonary HTN and for any further recs given patient's multiple hosp admissions w/ hypoxia, who plans to check bronchoscopy prior to DC    Acute on chronic congestive heart failure  Right sided 2/2 COPD (pulmonary HTN) 2/2 smoking and BERTHA; LE edema improved following Lasix 20 mg yesterday, but this may have contributed to his fall given his low BPs in the 90's/60's and pre-load dependant physiology.  - Consider sildenafil  - Hold furosemide  5/28/20: Gave furosemide 40 mg po again in between units of PRBCs.   - continue to monitor        BERTHA on CPAP  - cpap at night       Hemoptysis  Thought to be 2/2 pulm HTN  - Continue to monitor      COPD exacerbation  Copd pathway   Steroids   Breathing treatments   - Encouraging to quit smoking completely, which he started at age of 17 years, 1 PPD until age 60 years    Pulmonary hypertension  Will consider sildenafil as outpatient      Symptomatic anemia  Will consider TFx if Hgb drops <7  5/28/20: s/p 2U PRBCs, which has improved oxygenation and decreased sob/fatigue significantly  - Continue to monitor closely      VTE Risk Mitigation (From admission, onward)         Ordered     IP VTE HIGH RISK PATIENT  Once      05/23/20 1942     Place sequential compression device  Until discontinued      05/23/20 1942                      Bandar John MD  Department of Hospital Medicine   [facility]

## 2020-05-29 NOTE — ASSESSMENT & PLAN NOTE
- Pneumonia started broad spectrum Vanc & Cefepime  - po2 66   - 4L on nasal canula oxygen   - May also be partially due to right-sided CHF from severe pulmonary HTN  - F/U pulmonary recs  5/28/20: NC weaned off with sats in 92-94% range on RA since 2U PRBCs.   - NPO after MN for TRUDY in AM for further evaluation of his mitral valve

## 2020-05-29 NOTE — PLAN OF CARE
Plan of care reviewed with patient. Patient verbalized understanding.   ER able to establish midline in R basilic vein. Pt tolerated well.   Pt slept well last night.   Sats improved on room air; decreased reports/ severity of SOB.   TRUDY planned for today; pt NPO since 2230.  Bed remains low and locked, side rails up x 2, call bell and belongings within reach.   Bedside commode/urinal in room.   Frequent repositioning encouraged to prevent pressure injury.    PIV intact. Cardiac monitor in place.  No c/o or questions at this time.   Will continue to monitor.

## 2020-05-29 NOTE — ANESTHESIA RELEASE NOTE
"Anesthesia Release from PACU Note    Patient: David Valera    Procedure(s) Performed: Procedure(s) (LRB):  ECHOCARDIOGRAM, TRANSESOPHAGEAL (N/A)    Anesthesia type: MAC    Post pain: Adequate analgesia    Post assessment: no apparent anesthetic complications    Last Vitals:   Visit Vitals  BP (!) 86/64   Pulse 93   Temp (P) 36.8 °C (98.2 °F)   Resp (!) 22   Ht 5' 3" (1.6 m)   Wt 79.8 kg (176 lb)   SpO2 96%   BMI 31.18 kg/m²       Post vital signs: stable    Level of consciousness: awake, alert  and oriented    Nausea/Vomiting: no nausea/no vomiting    Complications: none    Airway Patency: patent    Respiratory: unassisted, spontaneous ventilation, room air    Cardiovascular: stable and blood pressure at baseline    Hydration: euvolemic  "

## 2020-05-29 NOTE — ANESTHESIA POSTPROCEDURE EVALUATION
Anesthesia Post Evaluation    Patient: David Valera    Procedure(s) Performed: Procedure(s) (LRB):  ECHOCARDIOGRAM, TRANSESOPHAGEAL (N/A)    Final Anesthesia Type: MAC    Patient location during evaluation: GI PACU  Patient participation: Yes- Able to Participate  Level of consciousness: awake and alert and oriented  Post-procedure vital signs: reviewed and stable  Pain management: adequate  Airway patency: patent    PONV status at discharge: No PONV  Anesthetic complications: no      Cardiovascular status: hemodynamically stable  Respiratory status: unassisted, spontaneous ventilation and room air  Hydration status: euvolemic  Follow-up not needed.          Vitals Value Taken Time   BP 86/64 5/29/2020  8:30 AM   Temp 35.8 °C (96.4 °F) 5/29/2020  7:10 AM   Pulse 77 5/29/2020  8:35 AM   Resp 23 5/29/2020  8:35 AM   SpO2 100 % 5/29/2020  8:35 AM   Vitals shown include unvalidated device data.      No case tracking events are documented in the log.      Pain/Radha Score: No data recorded

## 2020-05-29 NOTE — ANESTHESIA PREPROCEDURE EVALUATION
05/29/2020  David Valera is a 60 y.o., male.    Anesthesia Evaluation    I have reviewed the Patient Summary Reports.    I have reviewed the Nursing Notes. I have reviewed the NPO Status.   I have reviewed the Medications.     Review of Systems  Social:  Smoker, Social Alcohol Use 1 pack per day   Hematology/Oncology:  Hematology Normal   Oncology Normal     EENT/Dental:EENT/Dental Normal   Cardiovascular:   CHF    Pulmonary:   Pneumonia COPD, moderate Asthma Sleep Apnea    Education provided regarding risk of obstructive sleep apnea     Renal/:   Chronic Renal Disease, CRI    Hepatic/GI:  Hepatic/GI Normal    Musculoskeletal:  Musculoskeletal Normal    Neurological:  Neurology Normal    Endocrine:  Endocrine Normal    Dermatological:  Skin Normal    Psych:  Psychiatric Normal           Physical Exam  General:  Well nourished    Airway/Jaw/Neck:  Airway Findings: Mouth Opening: Normal Tongue: Normal  General Airway Assessment: Adult  Mallampati: II  TM Distance: Normal, at least 6 cm  Jaw/Neck Findings:  Neck ROM: Normal ROM      Dental:  Dental Findings: In tact   Chest/Lungs:  Chest/Lungs Findings: Normal Respiratory Rate     Heart/Vascular:  Heart Findings: Rate: Tachycardia        Mental Status:  Mental Status Findings:  Cooperative, Alert and Oriented         Anesthesia Plan  Type of Anesthesia, risks & benefits discussed:  Anesthesia Type:  MAC  Patient's Preference:   Intra-op Monitoring Plan: standard ASA monitors  Intra-op Monitoring Plan Comments:   Post Op Pain Control Plan:   Post Op Pain Control Plan Comments:   Induction:   IV  Beta Blocker:  Patient is not currently on a Beta-Blocker (No further documentation required).       Informed Consent: Patient understands risks and agrees with Anesthesia plan.  Questions answered. Anesthesia consent signed with patient.  ASA Score: 3     Day of  Surgery Review of History & Physical: I have interviewed and examined the patient. I have reviewed the patient's H&P dated:  There are no significant changes.          Ready For Surgery From Anesthesia Perspective.

## 2020-05-29 NOTE — BRIEF OP NOTE
<[facility]  Surgery Department  Operative Note    SUMMARY     Date of Procedure: 5/29/2020     Procedure: Procedure(s) (LRB):  ECHOCARDIOGRAM, TRANSESOPHAGEAL (N/A)     Surgeon(s) and Role:     * Renny Frankel MD - Primary    Assisting Surgeon: None    Pre-Operative Diagnosis: Abnormal echocardiogram [R93.1]    Post-Operative Diagnosis: Post-Op Diagnosis Codes:     * Abnormal echocardiogram [R93.1]    Anesthesia: Monitor Anesthesia Care    Technical Procedures Used: TRUDY    Description of the Findings of the Procedure: TRUDY,DX: MITRAL STENOSIS,  PHT 1/2- SEVERE, PLANIMETRY: 0.9 -SEVERE, SEVERE CALCIFICATION VALVULAR, SUBVALVULAR OK    Significant Surgical Tasks Conducted by the Assistant(s), if Applicable: NONE    Complications: No    Estimated Blood Loss (EBL): < 50 cc           Implants: * No implants in log *    Specimens:   Specimen (12h ago, onward)    None                  Condition: Good    Disposition: PACU - hemodynamically stable.    Attestation: I was present and scrubbed for the entire procedure.

## 2020-05-29 NOTE — SUBJECTIVE & OBJECTIVE
Interval History: No events overnights. He reports a significant improvement in his SOB and energy level.    Review of Systems   Constitutional: Negative for activity change and appetite change.   HENT: Negative for congestion, rhinorrhea and sore throat.    Eyes: Negative for discharge and redness.   Respiratory: Positive for cough (assoc with hemoptasis). Negative for chest tightness, shortness of breath and wheezing.    Cardiovascular: Negative for chest pain and leg swelling.   Gastrointestinal: Negative for abdominal pain, diarrhea and vomiting.   Endocrine: Negative for cold intolerance, heat intolerance and polyuria.   Genitourinary: Negative for difficulty urinating, dysuria, flank pain, hematuria and urgency.   Musculoskeletal: Negative for arthralgias, back pain and myalgias.   Skin: Negative for color change and pallor.   Allergic/Immunologic: Negative for immunocompromised state.   Neurological: Negative for dizziness, light-headedness and headaches.   Hematological: Negative for adenopathy.   Psychiatric/Behavioral: Negative for agitation and confusion. The patient is not nervous/anxious.      Objective:     Vital Signs (Most Recent):  Temp: 97.9 °F (36.6 °C) (05/28/20 2303)  Pulse: 91 (05/28/20 2303)  Resp: 18 (05/28/20 2303)  BP: 100/65 (05/28/20 2303)  SpO2: (!) 93 % (05/28/20 2303) Vital Signs (24h Range):  Temp:  [97.5 °F (36.4 °C)-98.8 °F (37.1 °C)] 97.9 °F (36.6 °C)  Pulse:  [] 91  Resp:  [17-24] 18  SpO2:  [93 %-100 %] 93 %  BP: ()/(65-78) 100/65     Weight: 76.7 kg (169 lb)  Body mass index is 29.94 kg/m².    Intake/Output Summary (Last 24 hours) at 5/28/2020 2316  Last data filed at 5/28/2020 1600  Gross per 24 hour   Intake 1336.67 ml   Output 625 ml   Net 711.67 ml      Physical Exam   Constitutional: He is oriented to person, place, and time. He appears toxic. He has a sickly appearance. He appears ill. No distress.   HENT:   Head: Normocephalic.   Eyes: Pupils are equal, round,  and reactive to light.   Neck: Neck supple.   Cardiovascular: Regular rhythm. Tachycardia present.   Murmur heard.  Pulmonary/Chest: He has rales.   Abdominal: Soft.   Musculoskeletal: He exhibits no edema.   Neurological: He is alert and oriented to person, place, and time.   Skin: Skin is warm and dry.   Nursing note and vitals reviewed.      Significant Labs:   CBC:   Recent Labs   Lab 05/27/20  0532 05/28/20  0703   WBC 15.05* 15.47*   HGB 7.0* 10.4*   HCT 23.7* 33.8*   * 360*     CMP:   Recent Labs   Lab 05/27/20  0532 05/28/20  0703   * 134*   K 4.3 4.9   CL 98 100   CO2 22* 21*   * 173*   BUN 46* 54*   CREATININE 1.8* 1.7*   CALCIUM 9.3 9.7   ANIONGAP 12 13   EGFRNONAA 40* 43*     All pertinent labs within the past 24 hours have been reviewed.    Significant Imaging: I have reviewed all pertinent imaging results/findings within the past 24 hours.

## 2020-05-29 NOTE — TRANSFER OF CARE
"Anesthesia Transfer of Care Note    Patient: David Valera    Procedure(s) Performed: Procedure(s) (LRB):  ECHOCARDIOGRAM, TRANSESOPHAGEAL (N/A)    Patient location: GI    Anesthesia Type: MAC    Transport from OR: Transported from OR on room air with adequate spontaneous ventilation    Post pain: adequate analgesia    Post assessment: no apparent anesthetic complications and tolerated procedure well    Post vital signs: stable    Level of consciousness: awake, alert and oriented    Nausea/Vomiting: no nausea/vomiting    Complications: none    Transfer of care protocol was followed      Last vitals:   Visit Vitals  /69   Pulse 91   Temp 35.8 °C (96.4 °F)   Resp 16   Ht 5' 3" (1.6 m)   Wt 80.2 kg (176 lb 12.9 oz)   SpO2 (!) 93%   BMI 31.32 kg/m²     "

## 2020-05-29 NOTE — NURSING TRANSFER
Nursing Transfer Note      5/29/2020     Transfer To: 238    Transfer via bed    Transfer with cardiac monitoring    Transported by [facility]    Medicines sent: none    Chart send with patient: Yes    Notified: spouse    Patient reassessed at: TRANSFERRED TO ROOM. TRANSFERRED TO BED.  TELEMETRY MONITER ON.  IV FLUIDS ON PUMP AT PRESCRIBED RATE.  PROCEDURAL ACCESS SITE WITHOUT BLEEDING OR HEMATOMA.  DRESSING DRY AND INTACT.  CARE HANDED OFF TO tony Araujo.......... (date, time)    Upon arrival to floor: cardiac monitor applied, patient oriented to room, call bell in reach and bed in lowest position

## 2020-05-29 NOTE — PLAN OF CARE
POC reviewed with patient and spouse via phone.   Patient is AAO, no c/o pain   TRUDY completed this shift.   SR/ST on cardiac monitor   IV ABX continue  Midline to Right upper arm   Fall Risk with hx of fall- Sitter at bedside, bed alarm , non skid socks when out of bed.   Call light in reach.

## 2020-05-29 NOTE — ASSESSMENT & PLAN NOTE
Right sided 2/2 COPD (pulmonary HTN) 2/2 smoking and BERTHA; LE edema improved following Lasix 20 mg yesterday, but this may have contributed to his fall given his low BPs in the 90's/60's and pre-load dependant physiology.  - Consider sildenafil  - Hold furosemide  5/28/20: Gave furosemide 40 mg po again in between units of PRBCs.   - continue to monitor

## 2020-05-30 PROBLEM — I35.0 SEVERE AORTIC STENOSIS: Status: ACTIVE | Noted: 2020-01-01

## 2020-05-30 PROBLEM — J18.9 PNEUMONIA: Status: RESOLVED | Noted: 2020-01-01 | Resolved: 2020-01-01

## 2020-05-30 NOTE — PLAN OF CARE
POC reviewed with patient and patients wife via phone.   AAO. No complaints of pain, N/V this shift.   IV Abx for Tx for Respiratory infection   Progressive mobility- Ambulated in room, in cooley, up to chair, up to side of bed  Fall risk- non skid socks, sitter, bed alarm.   Room air  SR on Cardiac monitor.   Will continue to monitor

## 2020-05-30 NOTE — ASSESSMENT & PLAN NOTE
5/25 Hold ASA and anticoagulants  Bronchoscopy prior to discharge  5/26 resolved  5/27 discussed with Dr. Pike, primary problem is mitral stenosis and associated hemoptysis. The primary issue to treat is the Congestive Heart failure and mitral stenosis. After this is stabilized will re-evaluate for bronchoscopy.  5/29 no further hemoptysis  05/30: RESOLVED

## 2020-05-30 NOTE — SUBJECTIVE & OBJECTIVE
Interval History:     Review of Systems   Constitutional: Negative for activity change and appetite change.   HENT: Negative for congestion, rhinorrhea and sore throat.    Eyes: Negative for discharge and redness.   Respiratory: Positive for cough. Negative for chest tightness, shortness of breath and wheezing.    Cardiovascular: Negative for chest pain and leg swelling.   Gastrointestinal: Negative for abdominal pain, diarrhea and vomiting.   Endocrine: Negative for cold intolerance, heat intolerance and polyuria.   Genitourinary: Negative for difficulty urinating, dysuria, flank pain, hematuria and urgency.   Musculoskeletal: Negative for arthralgias, back pain and myalgias.   Skin: Negative for color change and pallor.   Allergic/Immunologic: Negative for immunocompromised state.   Neurological: Negative for dizziness, light-headedness and headaches.   Hematological: Negative for adenopathy.   Psychiatric/Behavioral: Negative for agitation and confusion. The patient is not nervous/anxious.      Objective:     Vital Signs (Most Recent):  Temp: 97.4 °F (36.3 °C) (05/30/20 1529)  Pulse: 86 (05/30/20 1529)  Resp: 18 (05/30/20 1529)  BP: 97/65 (05/30/20 1529)  SpO2: 98 % (05/30/20 1529) Vital Signs (24h Range):  Temp:  [97 °F (36.1 °C)-98.5 °F (36.9 °C)] 97.4 °F (36.3 °C)  Pulse:  [] 86  Resp:  [18-20] 18  SpO2:  [95 %-99 %] 98 %  BP: ()/(65-90) 97/65     Weight: 85 kg (187 lb 6.3 oz)  Body mass index is 33.19 kg/m².    Intake/Output Summary (Last 24 hours) at 5/30/2020 1535  Last data filed at 5/30/2020 1300  Gross per 24 hour   Intake 750 ml   Output 1000 ml   Net -250 ml      Physical Exam   Constitutional: He appears well-developed and well-nourished.   HENT:   Head: Normocephalic.   Eyes: Pupils are equal, round, and reactive to light. EOM are normal.   Neck: Normal range of motion. Neck supple.   Cardiovascular: Regular rhythm.   Murmur heard.   No systolic murmur is present.   Crescendo decrescendo  diastolic murmur is present.  Pulmonary/Chest: Effort normal and breath sounds normal.   Abdominal: Soft. Bowel sounds are normal.   Musculoskeletal: Normal range of motion.   Neurological: He is alert.   Skin: Skin is warm.   Nursing note and vitals reviewed.      Significant Labs: All pertinent labs within the past 24 hours have been reviewed.    Significant Imaging: I have reviewed all pertinent imaging results/findings within the past 24 hours.

## 2020-05-30 NOTE — SUBJECTIVE & OBJECTIVE
Interval History: *  Seen and examined: TRUDY, severe Mitral stenosis, WBCC 18 K, H&H 10.3/33.8. [facility], No further hemoptysis, No bronchoscopy currently indicated.No fever, Admission Procal was Normal, On IV Cefepime that should be deescalated. COPD listed in Diagnosis 25 pack smoker, , No prior spirometry, on Symbicort, suspect cardiac asthma      Review of Systems   All other systems reviewed and are negative.        Objective:     Vital Signs (Most Recent):  Temp: 98 °F (36.7 °C) (05/30/20 1103)  Pulse: 91 (05/30/20 1103)  Resp: 18 (05/30/20 1103)  BP: 102/69 (05/30/20 1103)  SpO2: 95 % (05/30/20 1103) Vital Signs (24h Range):  Temp:  [97 °F (36.1 °C)-98.5 °F (36.9 °C)] 98 °F (36.7 °C)  Pulse:  [] 91  Resp:  [16-20] 18  SpO2:  [94 %-99 %] 95 %  BP: (102-120)/(69-86) 102/69     Weight: 85 kg (187 lb 6.3 oz)  Body mass index is 33.19 kg/m².      Intake/Output Summary (Last 24 hours) at 5/30/2020 1128  Last data filed at 5/30/2020 0800  Gross per 24 hour   Intake 987 ml   Output 1000 ml   Net -13 ml       Physical Exam   Constitutional: He appears well-developed and well-nourished.   HENT:   Head: Normocephalic.   Eyes: Pupils are equal, round, and reactive to light. EOM are normal.   Neck: Normal range of motion. Neck supple.   Cardiovascular: Regular rhythm.   Murmur heard.   No systolic murmur is present.   Crescendo decrescendo diastolic murmur is present.  Pulmonary/Chest: Effort normal and breath sounds normal.   Abdominal: Soft. Bowel sounds are normal.   Musculoskeletal: Normal range of motion.   Neurological: He is alert.   Skin: Skin is warm.   Nursing note and vitals reviewed.      Vents:  Oxygen Concentration (%): 24 (05/28/20 0812)    Lines/Drains/Airways     Peripheral Intravenous Line                 Midline Catheter Insertion/Assessment  - Single Lumen 05/29/20 0209 Right basilic vein (medial side of arm) 20g x 10cm 1 day                Significant Labs:    CBC/Anemia Profile:  Recent  Labs   Lab 05/29/20  0425 05/30/20  0550   WBC 16.86* 18.12*   HGB 10.0* 10.3*   HCT 32.7* 33.8*    332   MCV 77* 77*   RDW 18.6* 18.7*        Chemistries:  Recent Labs   Lab 05/29/20 0425 05/30/20  0550   * 135*   K 5.0 4.5    101   CO2 23 24   BUN 55* 48*   CREATININE 1.7* 1.6*   CALCIUM 9.0 8.9   MG 2.7* 2.7*   PHOS 4.7* 3.8       All pertinent labs within the past 24 hours have been reviewed.    Significant Imaging:  I have reviewed and interpreted all pertinent imaging results/findings within the past 24 hours.

## 2020-05-30 NOTE — ASSESSMENT & PLAN NOTE
Right sided 2/2 COPD (pulmonary HTN) 2/2 smoking and BERTHA; LE edema improved following Lasix 20 mg yesterday, but this may have contributed to his fall given his low BPs in the 90's/60's and pre-load dependant physiology.   Acute on chronic diastolic CHF - resolved  Compensated

## 2020-05-30 NOTE — SUBJECTIVE & OBJECTIVE
Interval History: episodes of cough syncope resolved after transfusion    Objective:     Vital Signs (Most Recent):  Temp: 98.5 °F (36.9 °C) (05/29/20 1935)  Pulse: 95 (05/29/20 1935)  Resp: 18 (05/29/20 1935)  BP: 120/86 (05/29/20 1935)  SpO2: 99 % (05/29/20 1935) Vital Signs (24h Range):  Temp:  [96.4 °F (35.8 °C)-98.5 °F (36.9 °C)] 98.5 °F (36.9 °C)  Pulse:  [] 95  Resp:  [16-27] 18  SpO2:  [93 %-100 %] 99 %  BP: ()/(63-88) 120/86     Weight: 79.8 kg (176 lb)  Body mass index is 31.18 kg/m².      Intake/Output Summary (Last 24 hours) at 5/29/2020 1958  Last data filed at 5/29/2020 1700  Gross per 24 hour   Intake 1187 ml   Output 1100 ml   Net 87 ml       Physical Exam   Constitutional: He is oriented to person, place, and time. He appears toxic. He has a sickly appearance. He appears ill. No distress.   HENT:   Head: Normocephalic.   Eyes: Pupils are equal, round, and reactive to light.   Neck: Neck supple.   Cardiovascular: Regular rhythm. Tachycardia present.   Murmur heard.  Pulmonary/Chest: He has rales.   Abdominal: Soft.   Musculoskeletal: He exhibits no edema.   Neurological: He is alert and oriented to person, place, and time.   Skin: Skin is warm and dry.   Nursing note and vitals reviewed.      Vents:  Oxygen Concentration (%): 24 (05/28/20 0812)    Lines/Drains/Airways     Peripheral Intravenous Line                 Midline Catheter Insertion/Assessment  - Single Lumen 05/29/20 0209 Right basilic vein (medial side of arm) 20g x 10cm less than 1 day                Significant Labs:    CBC/Anemia Profile:  Recent Labs   Lab 05/28/20  0703 05/29/20  0425   WBC 15.47* 16.86*   HGB 10.4* 10.0*   HCT 33.8* 32.7*   * 329   MCV 77* 77*   RDW 18.9* 18.6*        Chemistries:  Recent Labs   Lab 05/28/20  0703 05/29/20  0425   * 133*   K 4.9 5.0    100   CO2 21* 23   BUN 54* 55*   CREATININE 1.7* 1.7*   CALCIUM 9.7 9.0   MG 2.5 2.7*   PHOS 5.8* 4.7*       ABGs: No results for  input(s): PH, PCO2, HCO3, POCSATURATED, BE in the last 48 hours.  Bilirubin:   Recent Labs   Lab 05/05/20  0446 05/18/20  1427 05/20/20  0501 05/21/20  0448 05/23/20  1711   BILITOT 0.4 0.9 0.7 0.5 0.5     Blood Culture: No results for input(s): LABBLOO in the last 48 hours.  BMP:   Recent Labs   Lab 05/29/20  0425   *   *   K 5.0      CO2 23   BUN 55*   CREATININE 1.7*   CALCIUM 9.0   MG 2.7*     All pertinent labs within the past 24 hours have been reviewed.    Significant Imaging:  Past Medical History:   Diagnosis Date    CHF (congestive heart failure)     Emphysema of lung        Past Surgical History:   Procedure Laterality Date    HIP FRACTURE SURGERY         Review of patient's allergies indicates:  No Known Allergies    Family History     None        Tobacco Use    Smoking status: Current Every Day Smoker     Packs/day: 1.00     Years: 25.00     Pack years: 25.00    Smokeless tobacco: Never Used   Substance and Sexual Activity    Alcohol use: Not Currently    Drug use: Not on file    Sexual activity: Not on file         Review of Systems   Constitutional: Positive for diaphoresis and fatigue.   HENT: Positive for congestion, postnasal drip and rhinorrhea.    Respiratory: Positive for cough, chest tightness, shortness of breath, wheezing and stridor.    Cardiovascular: Positive for palpitations and leg swelling.   Gastrointestinal: Negative.    Endocrine: Negative.    Genitourinary: Negative.    Musculoskeletal: Positive for arthralgias.   Skin: Negative.    Allergic/Immunologic: Negative.    Neurological: Positive for weakness.   Hematological: Negative.      Objective:     Vital Signs (Most Recent):  Temp: 98.5 °F (36.9 °C) (05/29/20 1935)  Pulse: 95 (05/29/20 1935)  Resp: 18 (05/29/20 1935)  BP: 120/86 (05/29/20 1935)  SpO2: 99 % (05/29/20 1935) Vital Signs (24h Range):  Temp:  [96.4 °F (35.8 °C)-98.5 °F (36.9 °C)] 98.5 °F (36.9 °C)  Pulse:  [] 95  Resp:  [16-27] 18  SpO2:   [93 %-100 %] 99 %  BP: ()/(63-88) 120/86     Weight: 79.8 kg (176 lb)  Body mass index is 31.18 kg/m².      Intake/Output Summary (Last 24 hours) at 5/29/2020 1958  Last data filed at 5/29/2020 1700  Gross per 24 hour   Intake 1187 ml   Output 1100 ml   Net 87 ml       Physical Exam   Constitutional: He is oriented to person, place, and time. He appears well-developed and well-nourished.   HENT:   Head: Normocephalic and atraumatic.   Mouth/Throat: Oropharyngeal exudate present.   Eyes: Pupils are equal, round, and reactive to light. Conjunctivae are normal.   Neck: Neck supple. No JVD present. No tracheal deviation present. No thyromegaly present.   Cardiovascular: Normal rate, regular rhythm and normal heart sounds.   No murmur heard.  Pulmonary/Chest: Effort normal. He has decreased breath sounds. He has wheezes in the right lower field and the left lower field. He has no rhonchi. He has no rales.   Abdominal: Soft. Bowel sounds are normal.   Musculoskeletal: Normal range of motion. He exhibits no edema or tenderness.   Lymphadenopathy:     He has no cervical adenopathy.   Neurological: He is alert and oriented to person, place, and time.   Skin: Skin is warm and dry.   Nursing note and vitals reviewed.      Vents:  Oxygen Concentration (%): 24 (05/28/20 0812)    Lines/Drains/Airways     Peripheral Intravenous Line                 Midline Catheter Insertion/Assessment  - Single Lumen 05/29/20 0209 Right basilic vein (medial side of arm) 20g x 10cm less than 1 day                Significant Labs:    CBC/Anemia Profile:  Recent Labs   Lab 05/28/20  0703 05/29/20  0425   WBC 15.47* 16.86*   HGB 10.4* 10.0*   HCT 33.8* 32.7*   * 329   MCV 77* 77*   RDW 18.9* 18.6*        Chemistries:  Recent Labs   Lab 05/28/20  0703 05/29/20  0425   * 133*   K 4.9 5.0    100   CO2 21* 23   BUN 54* 55*   CREATININE 1.7* 1.7*   CALCIUM 9.7 9.0   MG 2.5 2.7*   PHOS 5.8* 4.7*       ABGs:   No results for  input(s): PH, PCO2, HCO3, POCSATURATED, BE in the last 48 hours.  BMP:   Recent Labs   Lab 05/29/20  0425   *   *   K 5.0      CO2 23   BUN 55*   CREATININE 1.7*   CALCIUM 9.0   MG 2.7*     CMP:   Recent Labs   Lab 05/28/20  0703 05/29/20  0425   * 133*   K 4.9 5.0    100   CO2 21* 23   * 157*   BUN 54* 55*   CREATININE 1.7* 1.7*   CALCIUM 9.7 9.0   ANIONGAP 13 10   EGFRNONAA 43* 43*     All pertinent labs within the past 24 hours have been reviewed.    Significant Imaging:   I have reviewed all pertinent imaging results/findings within the past 24 hours.  I have reviewed and interpreted all pertinent imaging results/findings within the past 24 hours.     Transesophageal echo (TRUDY)  · Severe tricuspid regurgitation.  · Moderate right ventricular enlargement.  · Moderate right atrial enlargement.  · Severe mitral sclerosis.  · Left ventricular systolic function. The estimated ejection fraction is   60%.  · Normal LV diastolic function.  · Mild left atrial enlargement.     I have reviewed all pertinent imaging results/findings within the past 24 hours.  I have reviewed and interpreted all pertinent imaging results/findings within the past 24 hours.

## 2020-05-30 NOTE — ASSESSMENT & PLAN NOTE
5/25 Hold ASA and anticoagulants  Bronchoscopy prior to discharge  5/26 resolved  5/27 discussed with Dr. Pike, primary problem is mitral stenosis and associated hemoptysis. The primary issue to treat is the Congestive Heart failure and mitral stenosis. After this is stabilized will re-evaluate for bronchoscopy.  5/29 no further hemoptysis

## 2020-05-30 NOTE — PROGRESS NOTES
[facility] Hospital Medicine  Progress Note    Patient Name: David Valera  MRN: [id number]  Patient Class: IP- Inpatient   Admission Date: 5/23/2020  Length of Stay: 4 days  Attending Physician: Cody Shah, *  Primary Care Provider: Rodrigo Ames MD        Subjective:     Principal Problem:Acute respiratory failure with hypoxia        HPI:  David Valera is a 60 y.o. AAM with PMH of COPD, CKD, HTN who is presenting as c/o SOB. He states he is a  and drives often long distances. He was recently admitted to the hospital for similar complaints however he had hemoptysis along with SOB. TB and PE were ruled out at that time.treated with levaquin and discharge yesterday but he continue to have shortness of breath . In ed patient was found to have leukocytosis . cxr shows left mid lung consolidation .started on vanco /cefepime respiratiory cultures . Patient was also given dose of lasix 60 mg in ed      Overview/Hospital Course:  5/24/20:  Pt continues to be sob, which he reports started 5 weeks ago and has led to 3 admissions in [facility], where he was r/o for PE and TB and he was last DC'd home on levofloxacin, but continued to have worsening sob, cough and LE edema.   - Encouraged to quit smoking completely and to avoid all foods or drinks with >190 mg sodium/serving.    5/25/20:  Pt continue to have sob, particularly with minimal ambulation. Discussed healthy lifestyle modifications in detail with pt and his niece, who is a nurse. Will restart gentle diuresis given pitting LE edema and elevated BNP to 760  - Pulm consulted given his severe pulmonary HTN (and severe LAE & BLAYNE) RE possible sildenafil and/or other suggestions   - Continue Vanc & Cefepime for now  - Continue to encourage to avoid all foods or drinks with >190 mg sodium/serving and to with >9 carbohydrate / fiber ratio.    5/26/20:  Patient fell last night while up to restroom and hit his head; Head  CT unremarkable; no residual pain or deficits since. No further hemoptasis.   - F/U bronchoscopy results once complete  - Consider sildenafil as outpatient  - Continue Vanc/Cefepime    5/27/20:  No events. Patient seen by cardiology, who recommended 2U PRBCs w/ furosemide in between to improve oxygenation.  - F/U further cards recs  5/28/20: No events overnights. Reports much improvement in fatigue and sob since receiving 2U PRBCs.   - NPO after MN for TRUDY in AM for further evaluation of mitral valve  5/30 Pt was seen and examined at bedside . He is s/p TRUDY which show MITRAL STENOSIS,  PHT 1/2- SEVERE, PLANIMETRY: 0.9 ,SEVERE, SEVERE CALCIFICATION VALVULAR, SUBVALVULAR OK . The case was d/w cardiology which  rec to f/u with [facility]  For the severe MS . Pulmonology rec to deescalate antibiotic  And wean off steroid . Pt  Is now on ra  And report feeling  better     Interval History:     Review of Systems   Constitutional: Negative for activity change and appetite change.   HENT: Negative for congestion, rhinorrhea and sore throat.    Eyes: Negative for discharge and redness.   Respiratory: Positive for cough. Negative for chest tightness, shortness of breath and wheezing.    Cardiovascular: Negative for chest pain and leg swelling.   Gastrointestinal: Negative for abdominal pain, diarrhea and vomiting.   Endocrine: Negative for cold intolerance, heat intolerance and polyuria.   Genitourinary: Negative for difficulty urinating, dysuria, flank pain, hematuria and urgency.   Musculoskeletal: Negative for arthralgias, back pain and myalgias.   Skin: Negative for color change and pallor.   Allergic/Immunologic: Negative for immunocompromised state.   Neurological: Negative for dizziness, light-headedness and headaches.   Hematological: Negative for adenopathy.   Psychiatric/Behavioral: Negative for agitation and confusion. The patient is not nervous/anxious.      Objective:     Vital Signs (Most Recent):  Temp: 97.4  °F (36.3 °C) (05/30/20 1529)  Pulse: 86 (05/30/20 1529)  Resp: 18 (05/30/20 1529)  BP: 97/65 (05/30/20 1529)  SpO2: 98 % (05/30/20 1529) Vital Signs (24h Range):  Temp:  [97 °F (36.1 °C)-98.5 °F (36.9 °C)] 97.4 °F (36.3 °C)  Pulse:  [] 86  Resp:  [18-20] 18  SpO2:  [95 %-99 %] 98 %  BP: ()/(65-90) 97/65     Weight: 85 kg (187 lb 6.3 oz)  Body mass index is 33.19 kg/m².    Intake/Output Summary (Last 24 hours) at 5/30/2020 1535  Last data filed at 5/30/2020 1300  Gross per 24 hour   Intake 750 ml   Output 1000 ml   Net -250 ml      Physical Exam   Constitutional: He appears well-developed and well-nourished.   HENT:   Head: Normocephalic.   Eyes: Pupils are equal, round, and reactive to light. EOM are normal.   Neck: Normal range of motion. Neck supple.   Cardiovascular: Regular rhythm.   Murmur heard.   No systolic murmur is present.   Crescendo decrescendo diastolic murmur is present.  Pulmonary/Chest: Effort normal and breath sounds normal.   Abdominal: Soft. Bowel sounds are normal.   Musculoskeletal: Normal range of motion.   Neurological: He is alert.   Skin: Skin is warm.   Nursing note and vitals reviewed.      Significant Labs: All pertinent labs within the past 24 hours have been reviewed.    Significant Imaging: I have reviewed all pertinent imaging results/findings within the past 24 hours.      Assessment/Plan:      * Acute respiratory failure with hypoxia  Multifactorial due  To CHF , PNA and severe AS  Resolved     Severe mitral  stenosis  Per TRUDY  [facility]       Cough syncope syndrome  Resolved since having 2U PRBCs      Nonrheumatic mitral valve stenosis  5/28/20: Will evaluate further with TRUDY in am      acute on chronic ckd 3  Avoid nephrotoxic drugs       Acute renal failure superimposed on stage 3 chronic kidney disease  Avoids nephrotoxic drugs   Will continue monitor cr       Acute on chronic congestive heart failure  Right sided 2/2 COPD (pulmonary HTN) 2/2  smoking and BERTHA; LE edema improved following Lasix 20 mg yesterday, but this may have contributed to his fall given his low BPs in the 90's/60's and pre-load dependant physiology.   Acute on chronic diastolic CHF - resolved  Compensated         BERTHA on CPAP  - cpap at night       Hemoptysis   -2/2 pulm HTN and severe AS  - Continue to monitor  -resolved       COPD exacerbation  Wean off steroid  Cont RADHA , LABA and ICS  Stable     Pulmonary hypertension  F/U pulmonology oputpt     Symptomatic anemia  : s/p 2U PRBCs, which has improved oxygenation and decreased sob/fatigue significantly  - Continue to monitor closely      VTE Risk Mitigation (From admission, onward)         Ordered     IP VTE HIGH RISK PATIENT  Once      05/23/20 1942     Place sequential compression device  Until discontinued      05/23/20 1942                      Cody Shah MD  Department of Hospital Medicine   [facility]

## 2020-05-30 NOTE — ASSESSMENT & PLAN NOTE
5/26 continue antibiotic  5/27 Infiltrates slightly worse - suspect due to Congestive Heart failure  Patient does not appear to be toxic. WBC count may be due to steroids - will reduce IV solumedrol  05/30: Deescalate to PO Abx, wean steriods.

## 2020-05-30 NOTE — ASSESSMENT & PLAN NOTE
Sildenafil not clearly indicated  5/27 Pulmonary Hypertension due to Congestive Heart failure from Mitral Stenosis  5/29 Secondary pulmonary hypertension

## 2020-05-30 NOTE — ASSESSMENT & PLAN NOTE
5/25 bronchodilators, steroids, jet nebs  5/28 taper steroids, continue bronchodilators  05/30: Continue bronchodilators, currently on RA,  DC Steriods Continue BUDESONIDE

## 2020-05-30 NOTE — ASSESSMENT & PLAN NOTE
: s/p 2U PRBCs, which has improved oxygenation and decreased sob/fatigue significantly  - Continue to monitor closely

## 2020-05-30 NOTE — ASSESSMENT & PLAN NOTE
Sildenafil not clearly indicated  5/27 Pulmonary Hypertension due to Congestive Heart failure from Mitral Stenosis  5/29 Secondary pulmonary hypertension  05/30: PH based on echo, likely due to Severe MS, fixing MS intervention of choice, No role for Vasodilator therapy currently

## 2020-05-30 NOTE — PROGRESS NOTES
[facility]   Pulmonology  Progress Note    Patient Name: David Valera  MRN: [id number]  Admission Date: 5/23/2020  Hospital Length of Stay: 4 days  Code Status: Full Code  Attending Provider: Cody Shah, *  Primary Care Provider: Rodrigo Ames MD   Principal Problem: Acute respiratory failure with hypoxia    Subjective:     61 y/o with Hx of shortness of breath, cough and chest congestion for 5 months. In the past 5- 6 weeks it has worsened and he reports intermittent blood tingle sputum production.45 pack year history of smoking and stopped a week prior to admission. Denies history of heart disease or chest pain.  He reports PMH of COPD, CKD, HTN     5/25 Started bronchodilators and diuresis. Echo perfomed at outside facility consistent with Mitral Valve disease  5/26 No more hemoptysis. However patient is getting light headed and fallling after periods of coughing, small amount of discolored phlegm  5/27 another episode of nears syncope  5/28 Improved with transfusion  5/29 still weak  05/30: Seen and examined: TRUDY, severe Mitral stenosis, WBCC 18 K, H&H 10.3/33.8. [facility], No further hemoptysis, No bronchoscopy currently indicated.No fever, Admission Procal was Normal, On IV Cefepime that should be deescalated.COPD listed in Diagnosis 25 pack smoker, , No prior spirometry, on Symbicort, suspect cardiac asthma, quantiferon was indeterminate      Interval History: *  Seen and examined: TRUDY, severe Mitral stenosis, WBCC 18 K, H&H 10.3/33.8. [facility], No further hemoptysis, No bronchoscopy currently indicated.No fever, Admission Procal was Normal, On IV Cefepime that should be deescalated. COPD listed in Diagnosis 25 pack smoker, , No prior spirometry, on Symbicort, suspect cardiac asthma      Review of Systems   All other systems reviewed and are negative.        Objective:     Vital Signs (Most Recent):  Temp: 98 °F (36.7 °C) (05/30/20 1103)  Pulse: 91 (05/30/20  1103)  Resp: 18 (05/30/20 1103)  BP: 102/69 (05/30/20 1103)  SpO2: 95 % (05/30/20 1103) Vital Signs (24h Range):  Temp:  [97 °F (36.1 °C)-98.5 °F (36.9 °C)] 98 °F (36.7 °C)  Pulse:  [] 91  Resp:  [16-20] 18  SpO2:  [94 %-99 %] 95 %  BP: (102-120)/(69-86) 102/69     Weight: 85 kg (187 lb 6.3 oz)  Body mass index is 33.19 kg/m².      Intake/Output Summary (Last 24 hours) at 5/30/2020 1128  Last data filed at 5/30/2020 0800  Gross per 24 hour   Intake 987 ml   Output 1000 ml   Net -13 ml       Physical Exam   Constitutional: He appears well-developed and well-nourished.   HENT:   Head: Normocephalic.   Eyes: Pupils are equal, round, and reactive to light. EOM are normal.   Neck: Normal range of motion. Neck supple.   Cardiovascular: Regular rhythm.   Murmur heard.   No systolic murmur is present.   Crescendo decrescendo diastolic murmur is present.  Pulmonary/Chest: Effort normal and breath sounds normal.   Abdominal: Soft. Bowel sounds are normal.   Musculoskeletal: Normal range of motion.   Neurological: He is alert.   Skin: Skin is warm.   Nursing note and vitals reviewed.      Vents:  Oxygen Concentration (%): 24 (05/28/20 0812)    Lines/Drains/Airways     Peripheral Intravenous Line                 Midline Catheter Insertion/Assessment  - Single Lumen 05/29/20 0209 Right basilic vein (medial side of arm) 20g x 10cm 1 day                Significant Labs:    CBC/Anemia Profile:  Recent Labs   Lab 05/29/20  0425 05/30/20  0550   WBC 16.86* 18.12*   HGB 10.0* 10.3*   HCT 32.7* 33.8*    332   MCV 77* 77*   RDW 18.6* 18.7*        Chemistries:  Recent Labs   Lab 05/29/20  0425 05/30/20  0550   * 135*   K 5.0 4.5    101   CO2 23 24   BUN 55* 48*   CREATININE 1.7* 1.6*   CALCIUM 9.0 8.9   MG 2.7* 2.7*   PHOS 4.7* 3.8       All pertinent labs within the past 24 hours have been reviewed.    Significant Imaging:  I have reviewed and interpreted all pertinent imaging results/findings within the past 24  hours.      ABG  Recent Labs   Lab 05/23/20 2048   PH 7.471*   PO2 65*   PCO2 30.4*   HCO3 22.2*   BE -1     Assessment/Plan:     * Acute respiratory failure with hypoxia  5/25 bronchodilators, steroids, jet nebs  5/28 taper steroids, continue bronchodilators  05/30: Continue bronchodilators, currently on RA,  DC Steriods Continue BUDESONIDE    Pneumonia  5/26 continue antibiotic  5/27 Infiltrates slightly worse - suspect due to Congestive Heart failure  Patient does not appear to be toxic. WBC count may be due to steroids - will reduce IV solumedrol  05/30: Deescalate to PO Abx, wean steriods.    BERTHA on CPAP  Patient states had test years ago, doesn't have machine  Needs to see in office reestablish care    Hemoptysis  5/25 Hold ASA and anticoagulants  Bronchoscopy prior to discharge  5/26 resolved  5/27 discussed with Dr. Pike, primary problem is mitral stenosis and associated hemoptysis. The primary issue to treat is the Congestive Heart failure and mitral stenosis. After this is stabilized will re-evaluate for bronchoscopy.  5/29 no further hemoptysis  05/30: RESOLVED    Pulmonary hypertension  Sildenafil not clearly indicated  5/27 Pulmonary Hypertension due to Congestive Heart failure from Mitral Stenosis  5/29 Secondary pulmonary hypertension  05/30: PH based on echo, likely due to Severe MS, fixing MS intervention of choice, No role for Vasodilator therapy currently    Sign off  Follow in office establish care       I have reviewed all labs and imaging studies and compared to previous results. I have also discussed labs with all the teams in the medical care of the patient and my plan is outlined below       Lemuel Noe MD  Pulmonology  [facility]

## 2020-05-30 NOTE — PROGRESS NOTES
[facility]  Pulmonology  Progress Note    Patient Name: David Valera  MRN: [id number]  Admission Date: 5/23/2020  Hospital Length of Stay: 3 days  Code Status: Full Code  Attending Provider: Bandar John MD  Primary Care Provider: Rodrigo Ames MD   Principal Problem: Acute respiratory failure with hypoxia    Subjective: still dyspnic     Interval History: episodes of cough syncope resolved after transfusion    Objective:     Vital Signs (Most Recent):  Temp: 98.5 °F (36.9 °C) (05/29/20 1935)  Pulse: 95 (05/29/20 1935)  Resp: 18 (05/29/20 1935)  BP: 120/86 (05/29/20 1935)  SpO2: 99 % (05/29/20 1935) Vital Signs (24h Range):  Temp:  [96.4 °F (35.8 °C)-98.5 °F (36.9 °C)] 98.5 °F (36.9 °C)  Pulse:  [] 95  Resp:  [16-27] 18  SpO2:  [93 %-100 %] 99 %  BP: ()/(63-88) 120/86     Weight: 79.8 kg (176 lb)  Body mass index is 31.18 kg/m².      Intake/Output Summary (Last 24 hours) at 5/29/2020 1958  Last data filed at 5/29/2020 1700  Gross per 24 hour   Intake 1187 ml   Output 1100 ml   Net 87 ml       Physical Exam   Constitutional: He is oriented to person, place, and time. He appears toxic. He has a sickly appearance. He appears ill. No distress.   HENT:   Head: Normocephalic.   Eyes: Pupils are equal, round, and reactive to light.   Neck: Neck supple.   Cardiovascular: Regular rhythm. Tachycardia present.   Murmur heard.  Pulmonary/Chest: He has rales.   Abdominal: Soft.   Musculoskeletal: He exhibits no edema.   Neurological: He is alert and oriented to person, place, and time.   Skin: Skin is warm and dry.   Nursing note and vitals reviewed.      Vents:  Oxygen Concentration (%): 24 (05/28/20 0812)    Lines/Drains/Airways     Peripheral Intravenous Line                 Midline Catheter Insertion/Assessment  - Single Lumen 05/29/20 0209 Right basilic vein (medial side of arm) 20g x 10cm less than 1 day                Significant Labs:    CBC/Anemia Profile:  Recent Labs   Lab  05/28/20  0703 05/29/20  0425   WBC 15.47* 16.86*   HGB 10.4* 10.0*   HCT 33.8* 32.7*   * 329   MCV 77* 77*   RDW 18.9* 18.6*        Chemistries:  Recent Labs   Lab 05/28/20  0703 05/29/20  0425   * 133*   K 4.9 5.0    100   CO2 21* 23   BUN 54* 55*   CREATININE 1.7* 1.7*   CALCIUM 9.7 9.0   MG 2.5 2.7*   PHOS 5.8* 4.7*       ABGs: No results for input(s): PH, PCO2, HCO3, POCSATURATED, BE in the last 48 hours.  Bilirubin:   Recent Labs   Lab 05/05/20  0446 05/18/20  1427 05/20/20  0501 05/21/20  0448 05/23/20  1711   BILITOT 0.4 0.9 0.7 0.5 0.5     Blood Culture: No results for input(s): LABBLOO in the last 48 hours.  BMP:   Recent Labs   Lab 05/29/20  0425   *   *   K 5.0      CO2 23   BUN 55*   CREATININE 1.7*   CALCIUM 9.0   MG 2.7*     All pertinent labs within the past 24 hours have been reviewed.    Significant Imaging:  Past Medical History:   Diagnosis Date    CHF (congestive heart failure)     Emphysema of lung        Past Surgical History:   Procedure Laterality Date    HIP FRACTURE SURGERY         Review of patient's allergies indicates:  No Known Allergies    Family History     None        Tobacco Use    Smoking status: Current Every Day Smoker     Packs/day: 1.00     Years: 25.00     Pack years: 25.00    Smokeless tobacco: Never Used   Substance and Sexual Activity    Alcohol use: Not Currently    Drug use: Not on file    Sexual activity: Not on file         Review of Systems   Constitutional: Positive for diaphoresis and fatigue.   HENT: Positive for congestion, postnasal drip and rhinorrhea.    Respiratory: Positive for cough, chest tightness, shortness of breath, wheezing and stridor.    Cardiovascular: Positive for palpitations and leg swelling.   Gastrointestinal: Negative.    Endocrine: Negative.    Genitourinary: Negative.    Musculoskeletal: Positive for arthralgias.   Skin: Negative.    Allergic/Immunologic: Negative.    Neurological: Positive for  weakness.   Hematological: Negative.      Objective:     Vital Signs (Most Recent):  Temp: 98.5 °F (36.9 °C) (05/29/20 1935)  Pulse: 95 (05/29/20 1935)  Resp: 18 (05/29/20 1935)  BP: 120/86 (05/29/20 1935)  SpO2: 99 % (05/29/20 1935) Vital Signs (24h Range):  Temp:  [96.4 °F (35.8 °C)-98.5 °F (36.9 °C)] 98.5 °F (36.9 °C)  Pulse:  [] 95  Resp:  [16-27] 18  SpO2:  [93 %-100 %] 99 %  BP: ()/(63-88) 120/86     Weight: 79.8 kg (176 lb)  Body mass index is 31.18 kg/m².      Intake/Output Summary (Last 24 hours) at 5/29/2020 1958  Last data filed at 5/29/2020 1700  Gross per 24 hour   Intake 1187 ml   Output 1100 ml   Net 87 ml       Physical Exam   Constitutional: He is oriented to person, place, and time. He appears well-developed and well-nourished.   HENT:   Head: Normocephalic and atraumatic.   Mouth/Throat: Oropharyngeal exudate present.   Eyes: Pupils are equal, round, and reactive to light. Conjunctivae are normal.   Neck: Neck supple. No JVD present. No tracheal deviation present. No thyromegaly present.   Cardiovascular: Normal rate, regular rhythm and normal heart sounds.   No murmur heard.  Pulmonary/Chest: Effort normal. He has decreased breath sounds. He has wheezes in the right lower field and the left lower field. He has no rhonchi. He has no rales.   Abdominal: Soft. Bowel sounds are normal.   Musculoskeletal: Normal range of motion. He exhibits no edema or tenderness.   Lymphadenopathy:     He has no cervical adenopathy.   Neurological: He is alert and oriented to person, place, and time.   Skin: Skin is warm and dry.   Nursing note and vitals reviewed.      Vents:  Oxygen Concentration (%): 24 (05/28/20 0812)    Lines/Drains/Airways     Peripheral Intravenous Line                 Midline Catheter Insertion/Assessment  - Single Lumen 05/29/20 0209 Right basilic vein (medial side of arm) 20g x 10cm less than 1 day                Significant Labs:    CBC/Anemia Profile:  Recent Labs   Lab  05/28/20 0703 05/29/20 0425   WBC 15.47* 16.86*   HGB 10.4* 10.0*   HCT 33.8* 32.7*   * 329   MCV 77* 77*   RDW 18.9* 18.6*        Chemistries:  Recent Labs   Lab 05/28/20 0703 05/29/20 0425   * 133*   K 4.9 5.0    100   CO2 21* 23   BUN 54* 55*   CREATININE 1.7* 1.7*   CALCIUM 9.7 9.0   MG 2.5 2.7*   PHOS 5.8* 4.7*       ABGs:   No results for input(s): PH, PCO2, HCO3, POCSATURATED, BE in the last 48 hours.  BMP:   Recent Labs   Lab 05/29/20 0425   *   *   K 5.0      CO2 23   BUN 55*   CREATININE 1.7*   CALCIUM 9.0   MG 2.7*     CMP:   Recent Labs   Lab 05/28/20 0703 05/29/20 0425   * 133*   K 4.9 5.0    100   CO2 21* 23   * 157*   BUN 54* 55*   CREATININE 1.7* 1.7*   CALCIUM 9.7 9.0   ANIONGAP 13 10   EGFRNONAA 43* 43*     All pertinent labs within the past 24 hours have been reviewed.    Significant Imaging:   I have reviewed all pertinent imaging results/findings within the past 24 hours.  I have reviewed and interpreted all pertinent imaging results/findings within the past 24 hours.     Transesophageal echo (TRUDY)  · Severe tricuspid regurgitation.  · Moderate right ventricular enlargement.  · Moderate right atrial enlargement.  · Severe mitral sclerosis.  · Left ventricular systolic function. The estimated ejection fraction is   60%.  · Normal LV diastolic function.  · Mild left atrial enlargement.     I have reviewed all pertinent imaging results/findings within the past 24 hours.  I have reviewed and interpreted all pertinent imaging results/findings within the past 24 hours.      ABG  Recent Labs   Lab 05/23/20 2048   PH 7.471*   PO2 65*   PCO2 30.4*   HCO3 22.2*   BE -1     Assessment/Plan:     Hemoptysis  5/25 Hold ASA and anticoagulants  Bronchoscopy prior to discharge  5/26 resolved  5/27 discussed with Dr. Pike, primary problem is mitral stenosis and associated hemoptysis. The primary issue to treat is the Congestive Heart failure and  mitral stenosis. After this is stabilized will re-evaluate for bronchoscopy.  5/29 no further hemoptysis    Pulmonary hypertension  Sildenafil not clearly indicated  5/27 Pulmonary Hypertension due to Congestive Heart failure from Mitral Stenosis  5/29 Secondary pulmonary hypertension           Juice Rock MD  Pulmonology  [facility]

## 2020-05-31 PROBLEM — J96.01 ACUTE RESPIRATORY FAILURE WITH HYPOXIA: Status: RESOLVED | Noted: 2020-01-01 | Resolved: 2020-01-01

## 2020-05-31 PROBLEM — I05.0 SEVERE MITRAL VALVE STENOSIS: Status: ACTIVE | Noted: 2020-01-01

## 2020-05-31 PROBLEM — R04.2 HEMOPTYSIS: Status: RESOLVED | Noted: 2020-01-01 | Resolved: 2020-01-01

## 2020-05-31 PROBLEM — J44.1 COPD EXACERBATION: Status: RESOLVED | Noted: 2020-01-01 | Resolved: 2020-01-01

## 2020-05-31 PROBLEM — D64.9 SYMPTOMATIC ANEMIA: Status: RESOLVED | Noted: 2020-01-01 | Resolved: 2020-01-01

## 2020-05-31 PROBLEM — N17.9 ACUTE RENAL FAILURE SUPERIMPOSED ON STAGE 3 CHRONIC KIDNEY DISEASE: Status: RESOLVED | Noted: 2020-01-01 | Resolved: 2020-01-01

## 2020-05-31 PROBLEM — N18.30 ACUTE RENAL FAILURE SUPERIMPOSED ON STAGE 3 CHRONIC KIDNEY DISEASE: Status: RESOLVED | Noted: 2020-01-01 | Resolved: 2020-01-01

## 2020-05-31 PROBLEM — N18.30 STAGE 3 CHRONIC KIDNEY DISEASE: Status: RESOLVED | Noted: 2020-01-01 | Resolved: 2020-01-01

## 2020-05-31 NOTE — PLAN OF CARE
AAO X4,  Rt upper arm midline  Changed to po abx yesterday  Continues to gain strength  Sitter at bedside; pt had fall this hosp stay

## 2020-05-31 NOTE — NURSING
Discharge orders reviewed with patient- verbalizes understanding. Discharging home via private vehicle. Personal belongings with patient

## 2020-05-31 NOTE — PLAN OF CARE
POC reviewed with Patient and Spouse, verbalizes understanding. Discharging home  Midline removed, Cardiac monitor removed

## 2020-06-01 NOTE — PLAN OF CARE
06/01/20 1734   Final Note   Assessment Type Final Discharge Note   Anticipated Discharge Disposition Home   Right Care Referral Info   Post Acute Recommendation No Care

## 2020-06-05 NOTE — DISCHARGE SUMMARY
[facility] Hospital Medicine  Discharge Summary      Patient Name: David Valera  MRN: [id number]  Admission Date: 5/23/2020  Hospital Length of Stay: 5 days  Discharge Date and Time: 5/31/2020 11:58 AM  Attending Physician: No att. providers found   Discharging Provider: Cody Shah MD  Primary Care Provider: Rodrigo Ames MD      HPI:   David Valera is a 60 y.o. AAM with PMH of COPD, CKD, HTN who is presenting as c/o SOB. He states he is a  and drives often long distances. He was recently admitted to the hospital for similar complaints however he had hemoptysis along with SOB. TB and PE were ruled out at that time.treated with levaquin and discharge yesterday but he continue to have shortness of breath . In ed patient was found to have leukocytosis . cxr shows left mid lung consolidation .started on vanco /cefepime respiratiory cultures . Patient was also given dose of lasix 60 mg in ed      Procedure(s) (LRB):  ECHOCARDIOGRAM, TRANSESOPHAGEAL (N/A)      Hospital Course:   5/24/20:  Pt continues to be sob, which he reports started 5 weeks ago and has led to 3 admissions in [facility], where he was r/o for PE and TB and he was last DC'd home on levofloxacin, but continued to have worsening sob, cough and LE edema.   - Encouraged to quit smoking completely and to avoid all foods or drinks with >190 mg sodium/serving.    5/25/20:  Pt continue to have sob, particularly with minimal ambulation. Discussed healthy lifestyle modifications in detail with pt and his niece, who is a nurse. Will restart gentle diuresis given pitting LE edema and elevated BNP to 760  - Pulm consulted given his severe pulmonary HTN (and severe LAE & BLAYNE) RE possible sildenafil and/or other suggestions   - Continue Vanc & Cefepime for now  - Continue to encourage to avoid all foods or drinks with >190 mg sodium/serving and to with >9 carbohydrate / fiber ratio.    5/26/20:  Patient fell  last night while up to restroom and hit his head; Head CT unremarkable; no residual pain or deficits since. No further hemoptasis.   - F/U bronchoscopy results once complete  - Consider sildenafil as outpatient  - Continue Vanc/Cefepime    5/27/20:  No events. Patient seen by cardiology, who recommended 2U PRBCs w/ furosemide in between to improve oxygenation.  - F/U further cards recs  5/28/20: No events overnights. Reports much improvement in fatigue and sob since receiving 2U PRBCs.   - NPO after MN for TRUDY in AM for further evaluation of mitral valve  5/30 Pt was seen and examined at bedside . He is s/p TRUDY which show MITRAL STENOSIS,  PHT 1/2- SEVERE, PLANIMETRY: 0.9 ,SEVERE, SEVERE CALCIFICATION VALVULAR, SUBVALVULAR OK . The case was d/w cardiology which  rec to f/u with [place]  For the severe MS . Pulmonology rec to deescalate antibiotic  And wean off steroid . Pt  Is now on ra  And report feeling  better   5/31 Pt was seen and examined at bedside . He was determined to be suitable for D/C  -TRUDY show severe mitral stenosis   -Pt will be f/u cardiology  At the [facility]  -WBC trending down . He was d/c on Levaquin   - The kidney function went back to  normal .  -The case was discussed in detail with the patient       Consults:   Consults (From admission, onward)        Status Ordering Provider     Inpatient consult to Cardiology  Once     Provider:  Shweta Pike MD    Completed KARAN BOWER          Severe mitral valve stenosis  Per TRUDY  Need to F/U in [place]       Cough syncope syndrome  Resolved since having 2U PRBCs      Nonrheumatic mitral valve stenosis  5/28/20: Will evaluate further with TRUDY in am      BERTHA on CPAP  - cpap at night       Pulmonary hypertension  F/U pulmonology oputpt       Final Active Diagnoses:    Diagnosis Date Noted POA    Severe mitral valve stenosis [I05.0] 05/30/2020 Yes    Nonrheumatic mitral valve stenosis [I34.2] 05/27/2020 Yes    Cough  syncope syndrome [R05] 05/27/2020 Yes    BERTHA on CPAP [G47.33, Z99.89] 05/20/2020 Not Applicable    Pulmonary hypertension [I27.20] 05/03/2020 Yes      Problems Resolved During this Admission:    Diagnosis Date Noted Date Resolved POA    PRINCIPAL PROBLEM:  Acute respiratory failure with hypoxia [J96.01] 05/23/2020 05/31/2020 Yes    Acute renal failure superimposed on stage 3 chronic kidney disease [N17.9, N18.3] 05/23/2020 05/31/2020 Yes    acute on chronic ckd 3 [N18.3] 05/23/2020 05/31/2020 Yes    Pneumonia [J18.9] 05/21/2020 05/30/2020 Yes    Acute on chronic congestive heart failure [I50.9]  05/31/2020 Yes    COPD exacerbation [J44.1] 05/18/2020 05/31/2020 Yes    Hemoptysis [R04.2] 05/18/2020 05/31/2020 Yes    Symptomatic anemia [D64.9] 04/14/2020 05/31/2020 Yes       Discharged Condition: stable    Disposition: Home or Self Care    Follow Up:  Follow-up Information     Nisha Montilla NP In 2 weeks.    Specialties:  Pulmonary Disease, Internal Medicine  Why:  Sleep evaluation and Spirometry  Contact information:  [address]  [place] [address]  [phone]             Rodrigo Ames MD In 1 week.    Specialty:  Family Medicine  Contact information:  [address] [facility] [address]  [phone]                 Patient Instructions:      Diet Cardiac     Notify your health care provider if you experience any of the following:  temperature >100.4     Notify your health care provider if you experience any of the following:  persistent nausea and vomiting or diarrhea     Notify your health care provider if you experience any of the following:  severe uncontrolled pain     Notify your health care provider if you experience any of the following:  redness, tenderness, or signs of infection (pain, swelling, redness, odor or green/yellow discharge around incision site)     Notify your health care provider if you experience any of the following:  difficulty breathing  or increased cough     Notify your health care provider if you experience any of the following:  severe persistent headache     Notify your health care provider if you experience any of the following:  worsening rash     Notify your health care provider if you experience any of the following:  persistent dizziness, light-headedness, or visual disturbances     Notify your health care provider if you experience any of the following:  increased confusion or weakness     Notify your health care provider if you experience any of the following:     Activity as tolerated       Significant Diagnostic Studies: Labs: BMP: No results for input(s): GLU, NA, K, CL, CO2, BUN, CREATININE, CALCIUM, MG in the last 48 hours.  Microbiology:   Blood Culture   Lab Results   Component Value Date    LABBLOO No growth after 5 days. 05/23/2020       Pending Diagnostic Studies:     Procedure Component Value Units Date/Time    Influenza antigen Nasopharyngeal Swab [[id number]] Collected:  05/23/20 2027    Order Status:  Sent Lab Status:  No result          Medications:  Reconciled Home Medications:      Medication List      CONTINUE taking these medications    * albuterol 90 mcg/actuation inhaler  Commonly known as:  PROAIR HFA  Inhale 2 puffs into the lungs every 4 (four) hours as needed for wheezing or shortness of breath     * albuterol 90 mcg/actuation inhaler  Commonly known as:  PROVENTIL/VENTOLIN HFA  Inhale 2 puffs into the lungs every 4 (four) hours as needed for Wheezing or Shortness of Breath.     budesonide-formoterol 80-4.5 mcg 80-4.5 mcg/actuation Hfaa  Commonly known as:  SYMBICORT  Inhale 2 puffs into the lungs every 6 to 8 hours as needed. Controller     furosemide 20 MG tablet  Commonly known as:  LASIX  Take 1 tablet (20 mg total) by mouth once daily.     levoFLOXacin 750 MG tablet  Commonly known as:  LEVAQUIN  Take 1 tablet (750 mg total) by mouth once daily. for 5 days     tiotropium bromide 2.5 mcg/actuation  Mist  Commonly known as:  SPIRIVA RESPIMAT  Inhale 2 puffs into the lungs every 6 to 8 hours as needed. Controller     traZODone 50 MG tablet  Commonly known as:  DESYREL  Take 50 mg by mouth nightly.         * This list has 2 medication(s) that are the same as other medications prescribed for you. Read the directions carefully, and ask your doctor or other care provider to review them with you.                Indwelling Lines/Drains at time of discharge:   Lines/Drains/Airways     None                 Time spent on the discharge of patient: 35 minutes  Patient was seen and examined on the date of discharge and determined to be suitable for discharge.         Cody Shah MD  Department of Hospital Medicine  [facility]

## 2020-06-11 ENCOUNTER — NURSE TRIAGE (OUTPATIENT)
Dept: ADMINISTRATIVE | Facility: CLINIC | Age: 60
End: 2020-06-11

## 2020-06-11 NOTE — TELEPHONE ENCOUNTER
Reason for Disposition   Information only question and nurse able to answer    Additional Information   Negative: Nursing judgment   Negative: Nursing judgment   Negative: Nursing judgment   Negative: Nursing judgment    Protocols used: NO PROTOCOL AVAILABLE - INFORMATION ONLY-A-[place]

## 2025-06-12 NOTE — SUBJECTIVE & OBJECTIVE
Review of Systems   Constitution: Positive for malaise/fatigue.   HENT: Negative.    Eyes: Negative.    Cardiovascular: Positive for dyspnea on exertion (improved).   Respiratory: Positive for shortness of breath (improved).    Endocrine: Negative.    Hematologic/Lymphatic: Negative.    Skin: Negative.    Musculoskeletal: Negative.    Gastrointestinal: Negative.    Genitourinary: Negative.    Neurological: Negative.    Psychiatric/Behavioral: Negative.    Allergic/Immunologic: Negative.      Objective:     Vital Signs (Most Recent):  Temp: 98.4 °F (36.9 °C) (05/28/20 1151)  Pulse: 90 (05/28/20 1300)  Resp: 18 (05/28/20 1151)  BP: 106/71 (05/28/20 1151)  SpO2: 98 % (05/28/20 1151) Vital Signs (24h Range):  Temp:  [96.9 °F (36.1 °C)-98.8 °F (37.1 °C)] 98.4 °F (36.9 °C)  Pulse:  [] 90  Resp:  [16-22] 18  SpO2:  [93 %-100 %] 98 %  BP: ()/(65-86) 106/71     Weight: 76.7 kg (169 lb)  Body mass index is 29.94 kg/m².     SpO2: 98 %  O2 Device (Oxygen Therapy): nasal cannula      Intake/Output Summary (Last 24 hours) at 5/28/2020 1347  Last data filed at 5/28/2020 0925  Gross per 24 hour   Intake 911.67 ml   Output 800 ml   Net 111.67 ml       Lines/Drains/Airways     Peripheral Intravenous Line                 Peripheral IV - Single Lumen 05/27/20 2300 20 G Right Antecubital less than 1 day                Physical Exam   Constitutional: He is oriented to person, place, and time. He appears well-developed and well-nourished. No distress.   HENT:   Head: Normocephalic and atraumatic.   Eyes: Pupils are equal, round, and reactive to light. Right eye exhibits no discharge. Left eye exhibits no discharge.   Neck: Neck supple. No JVD present.   Cardiovascular: Normal rate, regular rhythm, S1 normal and S2 normal.   Murmur heard.   Low-pitched rumbling crescendo presystolic murmur is present at the apex.  Pulmonary/Chest: Effort normal and breath sounds normal. No respiratory distress. He has no wheezes.  Addended by: ANDRÉS GUTIERREZ on: 11/19/2018 02:34 PM     Modules accepted: Orders       Diminished BS at bases   Abdominal: Soft. He exhibits no distension.   Musculoskeletal: He exhibits no edema.   Neurological: He is alert and oriented to person, place, and time.   Skin: Skin is warm and dry. He is not diaphoretic. No erythema.   Psychiatric: He has a normal mood and affect. His behavior is normal. Thought content normal.   Nursing note and vitals reviewed.      Significant Labs:   CMP   Recent Labs   Lab 05/27/20  0532 05/28/20  0703   * 134*   K 4.3 4.9   CL 98 100   CO2 22* 21*   * 173*   BUN 46* 54*   CREATININE 1.8* 1.7*   CALCIUM 9.3 9.7   ANIONGAP 12 13   ESTGFRAFRICA 46* 50*   EGFRNONAA 40* 43*   , CBC   Recent Labs   Lab 05/27/20  0532 05/28/20  0703   WBC 15.05* 15.47*   HGB 7.0* 10.4*   HCT 23.7* 33.8*   * 360*   , Troponin   Recent Labs   Lab 05/27/20  1318 05/27/20  1833   TROPONINI 0.031* 0.029*    and All pertinent lab results from the last 24 hours have been reviewed.    Significant Imaging: Echocardiogram: 2D echo with color flow doppler: No results found for this or any previous visit., EKG: REviewed and X-Ray: CXR: X-Ray Chest 1 View (CXR): No results found for this visit on 05/23/20. and X-Ray Chest PA and Lateral (CXR): No results found for this visit on 05/23/20.   Female